# Patient Record
Sex: FEMALE | Race: BLACK OR AFRICAN AMERICAN | NOT HISPANIC OR LATINO
[De-identification: names, ages, dates, MRNs, and addresses within clinical notes are randomized per-mention and may not be internally consistent; named-entity substitution may affect disease eponyms.]

---

## 2022-09-22 ENCOUNTER — APPOINTMENT (OUTPATIENT)
Dept: ORTHOPEDIC SURGERY | Facility: CLINIC | Age: 82
End: 2022-09-22
Payer: MEDICARE

## 2022-09-22 VITALS — BODY MASS INDEX: 20.16 KG/M2 | WEIGHT: 100 LBS | HEIGHT: 59 IN

## 2022-09-22 DIAGNOSIS — M65.322 TRIGGER FINGER, LEFT INDEX FINGER: ICD-10-CM

## 2022-09-22 PROCEDURE — 73140 X-RAY EXAM OF FINGER(S): CPT | Mod: LT,F1

## 2022-09-22 PROCEDURE — 99214 OFFICE O/P EST MOD 30 MIN: CPT | Mod: 25

## 2022-09-22 PROCEDURE — 99213 OFFICE O/P EST LOW 20 MIN: CPT | Mod: 25

## 2022-09-22 PROCEDURE — 73130 X-RAY EXAM OF HAND: CPT | Mod: LT

## 2022-09-22 PROCEDURE — 20550 NJX 1 TENDON SHEATH/LIGAMENT: CPT

## 2022-09-22 NOTE — HISTORY OF PRESENT ILLNESS
[de-identified] : 9/22/22: Pt reports locking in her left index finger.  The pain is worse in the morning.

## 2022-09-22 NOTE — IMAGING
[de-identified] : left index finger TTP a1 pulley\par +triggering\par otherwise farom\par NVID\par  [Left] : left hand [There are no fractures, subluxations or dislocations. No significant abnormalities are seen] : There are no fractures, subluxations or dislocations. No significant abnormalities are seen

## 2022-10-27 ENCOUNTER — EMERGENCY (EMERGENCY)
Facility: HOSPITAL | Age: 82
LOS: 0 days | Discharge: HOME | End: 2022-10-27
Attending: STUDENT IN AN ORGANIZED HEALTH CARE EDUCATION/TRAINING PROGRAM

## 2022-10-27 VITALS
TEMPERATURE: 99 F | SYSTOLIC BLOOD PRESSURE: 142 MMHG | OXYGEN SATURATION: 93 % | HEIGHT: 59 IN | DIASTOLIC BLOOD PRESSURE: 68 MMHG | RESPIRATION RATE: 20 BRPM | HEART RATE: 118 BPM | WEIGHT: 100.09 LBS

## 2022-10-27 VITALS — SYSTOLIC BLOOD PRESSURE: 131 MMHG | HEART RATE: 96 BPM | DIASTOLIC BLOOD PRESSURE: 65 MMHG

## 2022-10-27 DIAGNOSIS — Z20.822 CONTACT WITH AND (SUSPECTED) EXPOSURE TO COVID-19: ICD-10-CM

## 2022-10-27 DIAGNOSIS — J45.909 UNSPECIFIED ASTHMA, UNCOMPLICATED: ICD-10-CM

## 2022-10-27 DIAGNOSIS — R00.0 TACHYCARDIA, UNSPECIFIED: ICD-10-CM

## 2022-10-27 DIAGNOSIS — R00.2 PALPITATIONS: ICD-10-CM

## 2022-10-27 DIAGNOSIS — M81.0 AGE-RELATED OSTEOPOROSIS WITHOUT CURRENT PATHOLOGICAL FRACTURE: ICD-10-CM

## 2022-10-27 DIAGNOSIS — I10 ESSENTIAL (PRIMARY) HYPERTENSION: ICD-10-CM

## 2022-10-27 LAB
ALBUMIN SERPL ELPH-MCNC: 3.7 G/DL — SIGNIFICANT CHANGE UP (ref 3.3–5)
ALP SERPL-CCNC: 101 U/L — SIGNIFICANT CHANGE UP (ref 40–120)
ALT FLD-CCNC: 25 U/L — SIGNIFICANT CHANGE UP (ref 12–78)
ANION GAP SERPL CALC-SCNC: 6 MMOL/L — SIGNIFICANT CHANGE UP (ref 5–17)
APPEARANCE UR: CLEAR — SIGNIFICANT CHANGE UP
AST SERPL-CCNC: 28 U/L — SIGNIFICANT CHANGE UP (ref 15–37)
BASOPHILS # BLD AUTO: 0.05 K/UL — SIGNIFICANT CHANGE UP (ref 0–0.2)
BASOPHILS NFR BLD AUTO: 0.8 % — SIGNIFICANT CHANGE UP (ref 0–2)
BILIRUB SERPL-MCNC: 0.2 MG/DL — SIGNIFICANT CHANGE UP (ref 0.2–1.2)
BILIRUB UR-MCNC: NEGATIVE — SIGNIFICANT CHANGE UP
BUN SERPL-MCNC: 14 MG/DL — SIGNIFICANT CHANGE UP (ref 7–23)
CALCIUM SERPL-MCNC: 9.8 MG/DL — SIGNIFICANT CHANGE UP (ref 8.5–10.1)
CHLORIDE SERPL-SCNC: 108 MMOL/L — SIGNIFICANT CHANGE UP (ref 96–108)
CO2 SERPL-SCNC: 28 MMOL/L — SIGNIFICANT CHANGE UP (ref 22–31)
COLOR SPEC: YELLOW — SIGNIFICANT CHANGE UP
CREAT SERPL-MCNC: 0.43 MG/DL — LOW (ref 0.5–1.3)
D DIMER BLD IA.RAPID-MCNC: 319 NG/ML DDU — HIGH
DIFF PNL FLD: ABNORMAL
EGFR: 98 ML/MIN/1.73M2 — SIGNIFICANT CHANGE UP
EOSINOPHIL # BLD AUTO: 0.16 K/UL — SIGNIFICANT CHANGE UP (ref 0–0.5)
EOSINOPHIL NFR BLD AUTO: 2.5 % — SIGNIFICANT CHANGE UP (ref 0–6)
FLUAV AG NPH QL: SIGNIFICANT CHANGE UP
FLUBV AG NPH QL: SIGNIFICANT CHANGE UP
GLUCOSE SERPL-MCNC: 98 MG/DL — SIGNIFICANT CHANGE UP (ref 70–99)
GLUCOSE UR QL: NEGATIVE MG/DL — SIGNIFICANT CHANGE UP
HCT VFR BLD CALC: 42 % — SIGNIFICANT CHANGE UP (ref 34.5–45)
HGB BLD-MCNC: 12.6 G/DL — SIGNIFICANT CHANGE UP (ref 11.5–15.5)
IMM GRANULOCYTES NFR BLD AUTO: 0.2 % — SIGNIFICANT CHANGE UP (ref 0–0.9)
KETONES UR-MCNC: ABNORMAL
LEUKOCYTE ESTERASE UR-ACNC: ABNORMAL
LYMPHOCYTES # BLD AUTO: 2.29 K/UL — SIGNIFICANT CHANGE UP (ref 1–3.3)
LYMPHOCYTES # BLD AUTO: 35.1 % — SIGNIFICANT CHANGE UP (ref 13–44)
MAGNESIUM SERPL-MCNC: 1.8 MG/DL — SIGNIFICANT CHANGE UP (ref 1.6–2.6)
MCHC RBC-ENTMCNC: 24 PG — LOW (ref 27–34)
MCHC RBC-ENTMCNC: 30 G/DL — LOW (ref 32–36)
MCV RBC AUTO: 79.8 FL — LOW (ref 80–100)
MONOCYTES # BLD AUTO: 0.57 K/UL — SIGNIFICANT CHANGE UP (ref 0–0.9)
MONOCYTES NFR BLD AUTO: 8.7 % — SIGNIFICANT CHANGE UP (ref 2–14)
NEUTROPHILS # BLD AUTO: 3.44 K/UL — SIGNIFICANT CHANGE UP (ref 1.8–7.4)
NEUTROPHILS NFR BLD AUTO: 52.7 % — SIGNIFICANT CHANGE UP (ref 43–77)
NITRITE UR-MCNC: NEGATIVE — SIGNIFICANT CHANGE UP
NRBC # BLD: 0 /100 WBCS — SIGNIFICANT CHANGE UP (ref 0–0)
PH UR: 6 — SIGNIFICANT CHANGE UP (ref 5–8)
PLATELET # BLD AUTO: 331 K/UL — SIGNIFICANT CHANGE UP (ref 150–400)
POTASSIUM SERPL-MCNC: 4 MMOL/L — SIGNIFICANT CHANGE UP (ref 3.5–5.3)
POTASSIUM SERPL-SCNC: 4 MMOL/L — SIGNIFICANT CHANGE UP (ref 3.5–5.3)
PROT SERPL-MCNC: 8.1 GM/DL — SIGNIFICANT CHANGE UP (ref 6–8.3)
PROT UR-MCNC: 15 MG/DL
RBC # BLD: 5.26 M/UL — HIGH (ref 3.8–5.2)
RBC # FLD: 15.3 % — HIGH (ref 10.3–14.5)
RBC CASTS # UR COMP ASSIST: SIGNIFICANT CHANGE UP /HPF (ref 0–4)
SARS-COV-2 RNA SPEC QL NAA+PROBE: SIGNIFICANT CHANGE UP
SODIUM SERPL-SCNC: 142 MMOL/L — SIGNIFICANT CHANGE UP (ref 135–145)
SP GR SPEC: 1.02 — SIGNIFICANT CHANGE UP (ref 1.01–1.02)
TSH SERPL-MCNC: 0.82 UU/ML — SIGNIFICANT CHANGE UP (ref 0.36–3.74)
UROBILINOGEN FLD QL: NEGATIVE MG/DL — SIGNIFICANT CHANGE UP
WBC # BLD: 6.52 K/UL — SIGNIFICANT CHANGE UP (ref 3.8–10.5)
WBC # FLD AUTO: 6.52 K/UL — SIGNIFICANT CHANGE UP (ref 3.8–10.5)
WBC UR QL: SIGNIFICANT CHANGE UP

## 2022-10-27 PROCEDURE — 99285 EMERGENCY DEPT VISIT HI MDM: CPT

## 2022-10-27 PROCEDURE — 71045 X-RAY EXAM CHEST 1 VIEW: CPT | Mod: 26

## 2022-10-27 PROCEDURE — 93010 ELECTROCARDIOGRAM REPORT: CPT

## 2022-10-27 RX ORDER — SODIUM CHLORIDE 9 MG/ML
1000 INJECTION, SOLUTION INTRAVENOUS ONCE
Refills: 0 | Status: COMPLETED | OUTPATIENT
Start: 2022-10-27 | End: 2022-10-27

## 2022-10-27 RX ADMIN — SODIUM CHLORIDE 1000 MILLILITER(S): 9 INJECTION, SOLUTION INTRAVENOUS at 12:58

## 2022-10-27 NOTE — ED ADULT NURSE NOTE - OBJECTIVE STATEMENT
Patient reports waking up with her heart racing this morning, she went to her daughter and she felt her heart beating really fast. Patient is alert and oriented x3. Reports history of Asthma, hypertension and high cholesterol. Patient  /73, O2 94% room air.

## 2022-10-27 NOTE — ED PROVIDER NOTE - PATIENT PORTAL LINK FT
You can access the FollowMyHealth Patient Portal offered by [facility] by registering at the following website: [email or web address]. By joining [facility]’s FollowMyHealth portal, you will also be able to view your health information using other applications (apps) compatible with our system.

## 2022-10-27 NOTE — ED ADULT NURSE NOTE - NS ED NURSE DC INFO COMPLEXITY
D/C instructions not given/Straightforward: Basic instructions, no meds, no home treatment/Verbalized Understanding

## 2022-10-27 NOTE — ED PROVIDER NOTE - PROGRESS NOTE DETAILS
Oliver: Patient reassessed and feeling well.  HR improved.  Low risk by wells and age adjusted dimer negative.  Reviewed results and plan for dc.  Will give cards follow up and dc.

## 2022-10-27 NOTE — ED PROVIDER NOTE - OBJECTIVE STATEMENT
81yo female with pmh asthma, htn, presenting with palpitations.  Started earlier this morning while making breakfast.  Has had similar before with evaluation with cardiology several months ago told everything was ok.  Has not had this again until today.  Felt like heart was racing, improved now.  No cp, sob, cough, fever, loc, lightheadedness.

## 2022-10-27 NOTE — ED PROVIDER NOTE - NSFOLLOWUPINSTRUCTIONS_ED_ALL_ED_FT
Rest, drink plenty of fluids  Advance activity as tolerated  Continue all previously prescribed medications as directed  Follow up with your PMD - bring copies of your results  Return to the ER for chest pain, difficulty breathing, if you pass out, or other new or concerning symptoms

## 2022-10-27 NOTE — ED PROVIDER NOTE - CARE PROVIDERS DIRECT ADDRESSES
[email or web address]

## 2022-10-27 NOTE — ED PROVIDER NOTE - CLINICAL SUMMARY MEDICAL DECISION MAKING FREE TEXT BOX
Presenting with palpitations.  Sinus tach on ecg, upon eval nsr.  Well appearing, no loc/ syncope/ cp/ sob/ fever.  Plan for labs, tele, xr, urine, reassess.

## 2022-10-27 NOTE — ED PROVIDER NOTE - PHYSICAL EXAMINATION
General appearance: Nontoxic appearing, conversant, afebrile    Eyes: anicteric sclerae, APPLE, EOMI   HENT: Atraumatic; oropharynx clear, MMM and no ulcerations, no pharyngeal erythema or exudate   Neck: Trachea midline; Full range of motion, supple   Pulm: CTA bl, normal respiratory effort and no intercostal retractions, normal work of breathing   CV: RRR, No murmurs, rubs, or gallops   Abdomen: Soft, non-tender, non-distended; no guarding or rebound   Extremities: No peripheral edema, no gross deformities, FROM x4   Skin: Dry, normal temperature, turgor and texture; no rash   Psych: Appropriate affect, cooperative

## 2022-10-27 NOTE — ED PROVIDER NOTE - CARE PROVIDER_API CALL
Ron Jang)  Cardiology; Interventional Cardiology  [address] [place] [id number]  Phone: [phone]  Fax: [phone]  Follow Up Time:     Enrike Adamson)  Cardiology; Cardiovascular Disease; Nuclear Cardiology  [address] [place] [address], [place] [address]  Phone: [phone]  Fax: [phone]  Follow Up Time:

## 2022-10-27 NOTE — ED PROVIDER NOTE - NSICDXPASTSURGICALHX_GEN_ALL_CORE_FT
PAST SURGICAL HISTORY:   delivery delivered x1    S/P hernia surgery left inguinal hernia repair    S/P hip hemiarthroplasty right hip surgery

## 2022-10-29 LAB
CULTURE RESULTS: SIGNIFICANT CHANGE UP
SPECIMEN SOURCE: SIGNIFICANT CHANGE UP

## 2022-10-30 ENCOUNTER — NON-APPOINTMENT (OUTPATIENT)
Age: 82
End: 2022-10-30

## 2022-10-31 ENCOUNTER — APPOINTMENT (OUTPATIENT)
Dept: CARDIOLOGY | Facility: CLINIC | Age: 82
End: 2022-10-31

## 2022-10-31 ENCOUNTER — NON-APPOINTMENT (OUTPATIENT)
Age: 82
End: 2022-10-31

## 2022-10-31 VITALS
DIASTOLIC BLOOD PRESSURE: 84 MMHG | OXYGEN SATURATION: 91 % | SYSTOLIC BLOOD PRESSURE: 150 MMHG | BODY MASS INDEX: 19.59 KG/M2 | HEART RATE: 105 BPM | TEMPERATURE: 98.2 F | WEIGHT: 97 LBS

## 2022-10-31 VITALS — DIASTOLIC BLOOD PRESSURE: 70 MMHG | SYSTOLIC BLOOD PRESSURE: 130 MMHG

## 2022-10-31 VITALS — OXYGEN SATURATION: 95 %

## 2022-10-31 DIAGNOSIS — R06.89 DYSPNEA, UNSPECIFIED: ICD-10-CM

## 2022-10-31 DIAGNOSIS — R00.0 TACHYCARDIA, UNSPECIFIED: ICD-10-CM

## 2022-10-31 DIAGNOSIS — E78.49 OTHER HYPERLIPIDEMIA: ICD-10-CM

## 2022-10-31 DIAGNOSIS — R06.00 DYSPNEA, UNSPECIFIED: ICD-10-CM

## 2022-10-31 PROCEDURE — 99204 OFFICE O/P NEW MOD 45 MIN: CPT | Mod: 25

## 2022-10-31 PROCEDURE — 93000 ELECTROCARDIOGRAM COMPLETE: CPT

## 2022-10-31 RX ORDER — MONTELUKAST 10 MG/1
10 TABLET, FILM COATED ORAL
Refills: 0 | Status: ACTIVE | COMMUNITY

## 2022-10-31 RX ORDER — ALBUTEROL SULFATE 90 UG/1
108 (90 BASE) AEROSOL, METERED RESPIRATORY (INHALATION)
Refills: 0 | Status: ACTIVE | COMMUNITY

## 2022-10-31 RX ORDER — FELODIPINE 10 MG/1
10 TABLET, EXTENDED RELEASE ORAL
Refills: 0 | Status: ACTIVE | COMMUNITY

## 2022-10-31 RX ORDER — CLONIDINE HYDROCHLORIDE 0.1 MG/1
0.1 TABLET ORAL
Refills: 0 | Status: ACTIVE | COMMUNITY

## 2022-10-31 RX ORDER — ROSUVASTATIN CALCIUM 10 MG/1
10 TABLET, FILM COATED ORAL
Refills: 0 | Status: ACTIVE | COMMUNITY

## 2022-10-31 RX ORDER — FLUTICASONE PROPIONATE AND SALMETEROL XINAFOATE 230; 21 UG/1; UG/1
AEROSOL, METERED RESPIRATORY (INHALATION)
Refills: 0 | Status: ACTIVE | COMMUNITY

## 2022-10-31 NOTE — DISCUSSION/SUMMARY
[FreeTextEntry1] : Pt with sinus tach, tachypnea, O2 readings 91-95%\par She denies overt dyspnea but is breathing fast on exam\par Lung exam grossly normal\par She had a mildly elevated D-Dimer on a recent ER visit\par \par Her symptoms might all be secondary to asthma, but given the above, would want to rule out PE\par Will send for CTA to rule out PE\par Check echo to ensure normal LV size and function\par \par BP well controlled, cont meds\par Can consider event monitoring\par Pulmonary eval

## 2022-10-31 NOTE — HISTORY OF PRESENT ILLNESS
[FreeTextEntry1] : 82F with HTN, HLD who presents for eval of sinus tachycardia, racing heart\par \par Pt went to [facility] ER last week due to heart racing\par Noted on ECG with sinus tach to 120\par Slightly elevated D-Dimer, otherwise labs unremarkable\par HR improved, pt sent home\par Continues to feel sensation of heart racing\par Had a similar episode last year, was thought secondary to theophylline use which was d/c'ed\par She takes albuterol prn for asthma\par Denies CP, dyspnea, lightheadedness\par \par Never smoker. Accompanied by 2 daughters\par \par ECG: Sinus tach to 105 with nonspecific T wave changes\par \par Crestor 10mg\par \par Felodipine 10mg\par Clonidine 0.1mg TID\par \par

## 2022-10-31 NOTE — PHYSICAL EXAM
[Well Developed] : well developed [Well Nourished] : well nourished [No Acute Distress] : no acute distress [Normal Conjunctiva] : normal conjunctiva [Normal Venous Pressure] : normal venous pressure [No Carotid Bruit] : no carotid bruit [Normal S1, S2] : normal S1, S2 [No Murmur] : no murmur [No Rub] : no rub [No Gallop] : no gallop [Clear Lung Fields] : clear lung fields [Good Air Entry] : good air entry [Soft] : abdomen soft [Non Tender] : non-tender [No Masses/organomegaly] : no masses/organomegaly [Normal Bowel Sounds] : normal bowel sounds [Normal Gait] : normal gait [No Edema] : no edema [No Cyanosis] : no cyanosis [No Clubbing] : no clubbing [No Varicosities] : no varicosities [No Rash] : no rash [No Skin Lesions] : no skin lesions [Moves all extremities] : moves all extremities [No Focal Deficits] : no focal deficits [Normal Speech] : normal speech [Alert and Oriented] : alert and oriented [Normal memory] : normal memory [de-identified] : tachypneic

## 2022-11-04 ENCOUNTER — APPOINTMENT (OUTPATIENT)
Dept: INTERNAL MEDICINE | Facility: CLINIC | Age: 82
End: 2022-11-04

## 2022-11-04 PROCEDURE — 93306 TTE W/DOPPLER COMPLETE: CPT

## 2023-06-04 ENCOUNTER — NON-APPOINTMENT (OUTPATIENT)
Age: 83
End: 2023-06-04

## 2023-06-05 ENCOUNTER — EMERGENCY (EMERGENCY)
Facility: HOSPITAL | Age: 83
LOS: 0 days | Discharge: HOME | End: 2023-06-05
Attending: EMERGENCY MEDICINE
Payer: MEDICARE

## 2023-06-05 VITALS
DIASTOLIC BLOOD PRESSURE: 72 MMHG | OXYGEN SATURATION: 91 % | SYSTOLIC BLOOD PRESSURE: 126 MMHG | RESPIRATION RATE: 19 BRPM | TEMPERATURE: 99 F | WEIGHT: 98.99 LBS | HEART RATE: 104 BPM | HEIGHT: 59 IN

## 2023-06-05 VITALS
DIASTOLIC BLOOD PRESSURE: 78 MMHG | SYSTOLIC BLOOD PRESSURE: 145 MMHG | TEMPERATURE: 98 F | RESPIRATION RATE: 18 BRPM | HEART RATE: 98 BPM | OXYGEN SATURATION: 96 %
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
## 2023-06-05 DIAGNOSIS — Z04.3 ENCOUNTER FOR EXAMINATION AND OBSERVATION FOLLOWING OTHER ACCIDENT: ICD-10-CM
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
## 2023-06-05 DIAGNOSIS — J45.909 UNSPECIFIED ASTHMA, UNCOMPLICATED: ICD-10-CM

## 2023-06-05 DIAGNOSIS — M48.32 TRAUMATIC SPONDYLOPATHY, CERVICAL REGION: ICD-10-CM

## 2023-06-05 DIAGNOSIS — S01.21XA LACERATION WITHOUT FOREIGN BODY OF NOSE, INITIAL ENCOUNTER: ICD-10-CM

## 2023-06-05 DIAGNOSIS — E78.00 PURE HYPERCHOLESTEROLEMIA, UNSPECIFIED: ICD-10-CM

## 2023-06-05 DIAGNOSIS — I10 ESSENTIAL (PRIMARY) HYPERTENSION: ICD-10-CM

## 2023-06-05 DIAGNOSIS — M81.0 AGE-RELATED OSTEOPOROSIS WITHOUT CURRENT PATHOLOGICAL FRACTURE: ICD-10-CM

## 2023-06-05 PROCEDURE — 70450 CT HEAD/BRAIN W/O DYE: CPT | Mod: 26,MA

## 2023-06-05 PROCEDURE — 99284 EMERGENCY DEPT VISIT MOD MDM: CPT

## 2023-06-05 PROCEDURE — 70486 CT MAXILLOFACIAL W/O DYE: CPT | Mod: 26,MA

## 2023-06-05 PROCEDURE — 72125 CT NECK SPINE W/O DYE: CPT | Mod: 26,MA

## 2023-06-05 RX ORDER — BACITRACIN ZINC 500 UNIT/G
1 OINTMENT IN PACKET (EA) TOPICAL ONCE
Refills: 0 | Status: COMPLETED | OUTPATIENT
Start: 2023-06-05 | End: 2023-06-05

## 2023-06-05 RX ADMIN — Medication 1 APPLICATION(S): at 14:15

## 2023-06-05 NOTE — ED ADULT NURSE NOTE - NSFALLRISKINTERV_ED_ALL_ED

## 2023-06-05 NOTE — ED ADULT TRIAGE NOTE - CHIEF COMPLAINT QUOTE
patient c/o of nose pain , patient  had a fall today face down no LOC , denied dizziness denied blurred vision , denied any weakness , moving all extremities no facial droop clear speech at this time patient on Aspirin, denied difficulty breathing

## 2023-06-05 NOTE — ED PROVIDER NOTE - OBJECTIVE STATEMENT
This patient is an 82 year old woman sent from urgent care for CT scan after a fall.  Patient fell from standing hitting her face.  No LOC.  Patient is not on anticoagulation medication.  No chest pain, vomiting, dizziness or headache.  She reports pain at the wound site.

## 2023-06-05 NOTE — ED PROVIDER NOTE - PATIENT PORTAL LINK FT
You can access the FollowMyHealth Patient Portal offered by [facility] by registering at the following website: [email or web address]. By joining [employer]’s FollowMyHealth portal, you will also be able to view your health information using other applications (apps) compatible with our system.

## 2023-06-05 NOTE — ED PROVIDER NOTE - CLINICAL SUMMARY MEDICAL DECISION MAKING FREE TEXT BOX
Nose pain s/p fall low suspicion for fracture r/o ICH.  Will get CT.  Patient was offered pain medication but declined. Nose pain s/p fall low suspicion for fracture r/o ICH.  Will get CT.  Patient was offered pain medication but declined.    CT negative.  Wound care instructions given and patient discharged

## 2023-06-05 NOTE — ED ADULT NURSE NOTE - OBJECTIVE STATEMENT
Pt is A&OX4, ambulatory with cane, accompanied by two daughters. S/p fall today, fell on nose. Complaining of bleeding at the time of fall, no bleeding now. Abrasion noted to nose. Denies LOC, blurry vision, weakness. Denies pain at the time. Pt taking asprin 81mg. pmh of htn, asthma, high cholesterol

## 2023-06-05 NOTE — ED PROVIDER NOTE - NSFOLLOWUPINSTRUCTIONS_ED_ALL_ED_FT
1) Take tylenol for pain  2) Keep wound clean.  You can wash with soap and water, dry then apply bacitracin or vaseline/petroleum jelly  3) Follow up with your primary care doctor  4) Return to the ER for worsening or concerning symptoms
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
## 2023-07-31 ENCOUNTER — APPOINTMENT (OUTPATIENT)
Dept: CARDIOLOGY | Facility: CLINIC | Age: 83
End: 2023-07-31
Payer: MEDICARE

## 2023-07-31 DIAGNOSIS — I35.0 NONRHEUMATIC AORTIC (VALVE) STENOSIS: ICD-10-CM

## 2023-07-31 DIAGNOSIS — I51.9 HEART DISEASE, UNSPECIFIED: ICD-10-CM

## 2023-07-31 DIAGNOSIS — I10 ESSENTIAL (PRIMARY) HYPERTENSION: ICD-10-CM

## 2023-07-31 PROBLEM — E78.00 PURE HYPERCHOLESTEROLEMIA, UNSPECIFIED: Chronic | Status: ACTIVE | Noted: 2023-06-05

## 2023-07-31 PROCEDURE — 93000 ELECTROCARDIOGRAM COMPLETE: CPT

## 2023-07-31 PROCEDURE — 99213 OFFICE O/P EST LOW 20 MIN: CPT | Mod: 25

## 2023-08-02 VITALS
WEIGHT: 95 LBS | DIASTOLIC BLOOD PRESSURE: 64 MMHG | SYSTOLIC BLOOD PRESSURE: 122 MMHG | BODY MASS INDEX: 19.15 KG/M2 | OXYGEN SATURATION: 95 % | HEART RATE: 92 BPM | HEIGHT: 59 IN

## 2023-08-02 PROBLEM — I10 BENIGN ESSENTIAL HYPERTENSION: Status: ACTIVE | Noted: 2022-10-31

## 2023-08-02 PROBLEM — I35.0 MILD AORTIC STENOSIS: Status: ACTIVE | Noted: 2023-08-02

## 2023-08-02 PROBLEM — I51.9 MILD DIASTOLIC DYSFUNCTION: Status: ACTIVE | Noted: 2023-08-02

## 2023-08-02 NOTE — HISTORY OF PRESENT ILLNESS
[FreeTextEntry1] : 83F with HTN, HLD who presents for eval of sinus tachycardia, here for follow up   Pt feeling great overall No further episodes of racing heart No CP, dyspnea, palpitations  Last visit, sent for CTA which was negative for PE  HISTORY:  Pt went to [facility] ER last week due to heart racing Noted on ECG with sinus tach to 120 Slightly elevated D-Dimer, otherwise labs unremarkable HR improved, pt sent home Had a similar episode last year, was thought secondary to theophylline use which was d/c'ed She takes albuterol prn for asthma  Never smoker. Accompanied by daughter  ECG: Sinus 92, nonspecific ST-T wave changes TTE: 65-70%, mild DD, mild AS   Crestor 10mg  Felodipine 10mg Clonidine 0.1mg TID

## 2023-08-02 NOTE — PHYSICAL EXAM
[Well Developed] : well developed [Well Nourished] : well nourished [No Acute Distress] : no acute distress [Normal Conjunctiva] : normal conjunctiva [Normal Venous Pressure] : normal venous pressure [No Carotid Bruit] : no carotid bruit [Normal S1, S2] : normal S1, S2 [No Rub] : no rub [No Gallop] : no gallop [Clear Lung Fields] : clear lung fields [Good Air Entry] : good air entry [Soft] : abdomen soft [Non Tender] : non-tender [No Masses/organomegaly] : no masses/organomegaly [Normal Bowel Sounds] : normal bowel sounds [Normal Gait] : normal gait [No Cyanosis] : no cyanosis [No Clubbing] : no clubbing [No Varicosities] : no varicosities [No Rash] : no rash [No Skin Lesions] : no skin lesions [Moves all extremities] : moves all extremities [No Focal Deficits] : no focal deficits [Normal Speech] : normal speech [Alert and Oriented] : alert and oriented [Normal memory] : normal memory [Murmur] : murmur [Edema ___] : edema [unfilled] [de-identified] : 2/6 murmur on exam [de-identified] : tachypneic

## 2024-01-12 ENCOUNTER — APPOINTMENT (OUTPATIENT)
Dept: CARDIOLOGY | Facility: CLINIC | Age: 84
End: 2024-01-12

## 2024-07-19 ENCOUNTER — TRANSCRIPTION ENCOUNTER (OUTPATIENT)
Age: 84
End: 2024-07-19

## 2024-07-23 ENCOUNTER — TRANSCRIPTION ENCOUNTER (OUTPATIENT)
Age: 84
End: 2024-07-23

## 2024-10-25 PROBLEM — I10 ESSENTIAL (PRIMARY) HYPERTENSION: Chronic | Status: ACTIVE | Noted: 2024-07-19

## 2024-10-25 PROBLEM — J45.909 UNSPECIFIED ASTHMA, UNCOMPLICATED: Chronic | Status: ACTIVE | Noted: 2024-07-19

## 2024-10-25 PROBLEM — E78.5 HYPERLIPIDEMIA, UNSPECIFIED: Chronic | Status: ACTIVE | Noted: 2024-07-19

## 2024-10-28 ENCOUNTER — APPOINTMENT (OUTPATIENT)
Dept: ORTHOPEDIC SURGERY | Facility: CLINIC | Age: 84
End: 2024-10-28

## 2024-10-28 VITALS — WEIGHT: 96 LBS | HEIGHT: 59 IN | BODY MASS INDEX: 19.35 KG/M2

## 2024-10-28 DIAGNOSIS — E78.00 PURE HYPERCHOLESTEROLEMIA, UNSPECIFIED: ICD-10-CM

## 2024-10-28 DIAGNOSIS — M25.561 PAIN IN RIGHT KNEE: ICD-10-CM

## 2024-10-28 DIAGNOSIS — J45.909 UNSPECIFIED ASTHMA, UNCOMPLICATED: ICD-10-CM

## 2024-10-28 DIAGNOSIS — I10 ESSENTIAL (PRIMARY) HYPERTENSION: ICD-10-CM

## 2024-10-28 DIAGNOSIS — Z78.9 OTHER SPECIFIED HEALTH STATUS: ICD-10-CM

## 2024-10-28 PROCEDURE — 73564 X-RAY EXAM KNEE 4 OR MORE: CPT | Mod: RT

## 2024-10-28 PROCEDURE — 20611 DRAIN/INJ JOINT/BURSA W/US: CPT | Mod: RT

## 2024-10-28 PROCEDURE — J3490M: CUSTOM

## 2024-10-28 RX ORDER — CLONIDINE HYDROCHLORIDE 0.1 MG/1
0.1 TABLET ORAL
Refills: 0 | Status: ACTIVE | COMMUNITY

## 2024-10-28 RX ORDER — ALBUTEROL SULFATE 2.5 MG/.5ML
SOLUTION RESPIRATORY (INHALATION)
Refills: 0 | Status: ACTIVE | COMMUNITY

## 2024-11-07 ENCOUNTER — APPOINTMENT (OUTPATIENT)
Dept: ORTHOPEDIC SURGERY | Facility: CLINIC | Age: 84
End: 2024-11-07

## 2024-11-07 VITALS — BODY MASS INDEX: 19.35 KG/M2 | WEIGHT: 96 LBS | HEIGHT: 59 IN

## 2024-11-07 PROBLEM — M17.11 PRIMARY OSTEOARTHRITIS OF RIGHT KNEE: Status: ACTIVE | Noted: 2024-10-28

## 2024-11-07 PROCEDURE — 20611 DRAIN/INJ JOINT/BURSA W/US: CPT | Mod: RT

## 2024-11-14 ENCOUNTER — APPOINTMENT (OUTPATIENT)
Dept: ORTHOPEDIC SURGERY | Facility: CLINIC | Age: 84
End: 2024-11-14
Payer: MEDICARE

## 2024-11-14 VITALS — HEIGHT: 59 IN | BODY MASS INDEX: 19.35 KG/M2 | WEIGHT: 96 LBS

## 2024-11-14 PROCEDURE — 20611 DRAIN/INJ JOINT/BURSA W/US: CPT | Mod: RT

## 2024-11-15 ENCOUNTER — APPOINTMENT (OUTPATIENT)
Dept: ORTHOPEDIC SURGERY | Facility: CLINIC | Age: 84
End: 2024-11-15

## 2024-11-22 ENCOUNTER — APPOINTMENT (OUTPATIENT)
Dept: ORTHOPEDIC SURGERY | Facility: CLINIC | Age: 84
End: 2024-11-22

## 2024-11-22 VITALS — HEIGHT: 59 IN

## 2024-11-22 DIAGNOSIS — M17.11 UNILATERAL PRIMARY OSTEOARTHRITIS, RIGHT KNEE: ICD-10-CM

## 2024-11-22 PROCEDURE — 20611 DRAIN/INJ JOINT/BURSA W/US: CPT | Mod: RT

## 2024-12-30 ENCOUNTER — INPATIENT (INPATIENT)
Facility: HOSPITAL | Age: 84
LOS: 10 days | Discharge: HOME | End: 2025-01-10
Attending: INTERNAL MEDICINE | Admitting: INTERNAL MEDICINE
Payer: MEDICARE

## 2024-12-30 VITALS
TEMPERATURE: 98 F | RESPIRATION RATE: 25 BRPM | SYSTOLIC BLOOD PRESSURE: 118 MMHG | DIASTOLIC BLOOD PRESSURE: 58 MMHG | OXYGEN SATURATION: 88 % | HEIGHT: 57 IN | WEIGHT: 95.9 LBS | HEART RATE: 125 BPM

## 2024-12-30 DIAGNOSIS — I10 ESSENTIAL (PRIMARY) HYPERTENSION: ICD-10-CM

## 2024-12-30 DIAGNOSIS — E78.5 HYPERLIPIDEMIA, UNSPECIFIED: ICD-10-CM

## 2024-12-30 DIAGNOSIS — J96.01 ACUTE RESPIRATORY FAILURE WITH HYPOXIA: ICD-10-CM

## 2024-12-30 DIAGNOSIS — J45.901 UNSPECIFIED ASTHMA WITH (ACUTE) EXACERBATION: ICD-10-CM

## 2024-12-30 LAB
ALBUMIN SERPL ELPH-MCNC: 3.1 G/DL — LOW (ref 3.3–5)
ALP SERPL-CCNC: 123 U/L — HIGH (ref 40–120)
ALT FLD-CCNC: 18 U/L — SIGNIFICANT CHANGE UP (ref 12–78)
ANION GAP SERPL CALC-SCNC: 2 MMOL/L — LOW (ref 5–17)
APPEARANCE UR: CLEAR — SIGNIFICANT CHANGE UP
APTT BLD: 28.8 SEC — SIGNIFICANT CHANGE UP (ref 24.5–35.6)
AST SERPL-CCNC: 19 U/L — SIGNIFICANT CHANGE UP (ref 15–37)
BASOPHILS # BLD AUTO: 0.04 K/UL — SIGNIFICANT CHANGE UP (ref 0–0.2)
BASOPHILS NFR BLD AUTO: 0.4 % — SIGNIFICANT CHANGE UP (ref 0–2)
BILIRUB SERPL-MCNC: 0.2 MG/DL — SIGNIFICANT CHANGE UP (ref 0.2–1.2)
BILIRUB UR-MCNC: NEGATIVE — SIGNIFICANT CHANGE UP
BUN SERPL-MCNC: 14 MG/DL — SIGNIFICANT CHANGE UP (ref 7–23)
CALCIUM SERPL-MCNC: 9.7 MG/DL — SIGNIFICANT CHANGE UP (ref 8.5–10.1)
CHLORIDE SERPL-SCNC: 106 MMOL/L — SIGNIFICANT CHANGE UP (ref 96–108)
CO2 SERPL-SCNC: 31 MMOL/L — SIGNIFICANT CHANGE UP (ref 22–31)
COLOR SPEC: YELLOW — SIGNIFICANT CHANGE UP
CREAT SERPL-MCNC: 0.6 MG/DL — SIGNIFICANT CHANGE UP (ref 0.5–1.3)
DIFF PNL FLD: NEGATIVE — SIGNIFICANT CHANGE UP
EGFR: 90 ML/MIN/1.73M2 — SIGNIFICANT CHANGE UP
EOSINOPHIL # BLD AUTO: 0.26 K/UL — SIGNIFICANT CHANGE UP (ref 0–0.5)
EOSINOPHIL NFR BLD AUTO: 2.4 % — SIGNIFICANT CHANGE UP (ref 0–6)
FLUAV AG NPH QL: SIGNIFICANT CHANGE UP
FLUBV AG NPH QL: SIGNIFICANT CHANGE UP
GLUCOSE SERPL-MCNC: 103 MG/DL — HIGH (ref 70–99)
GLUCOSE UR QL: 250 MG/DL
HCT VFR BLD CALC: 42.7 % — SIGNIFICANT CHANGE UP (ref 34.5–45)
HGB BLD-MCNC: 12.8 G/DL — SIGNIFICANT CHANGE UP (ref 11.5–15.5)
IMM GRANULOCYTES NFR BLD AUTO: 0.2 % — SIGNIFICANT CHANGE UP (ref 0–0.9)
INR BLD: 1.15 RATIO — SIGNIFICANT CHANGE UP (ref 0.85–1.16)
KETONES UR-MCNC: 15 MG/DL
LACTATE SERPL-SCNC: 1.4 MMOL/L — SIGNIFICANT CHANGE UP (ref 0.7–2)
LEUKOCYTE ESTERASE UR-ACNC: NEGATIVE — SIGNIFICANT CHANGE UP
LYMPHOCYTES # BLD AUTO: 2.2 K/UL — SIGNIFICANT CHANGE UP (ref 1–3.3)
LYMPHOCYTES # BLD AUTO: 20.7 % — SIGNIFICANT CHANGE UP (ref 13–44)
MCHC RBC-ENTMCNC: 24.5 PG — LOW (ref 27–34)
MCHC RBC-ENTMCNC: 30 G/DL — LOW (ref 32–36)
MCV RBC AUTO: 81.6 FL — SIGNIFICANT CHANGE UP (ref 80–100)
MONOCYTES # BLD AUTO: 0.66 K/UL — SIGNIFICANT CHANGE UP (ref 0–0.9)
MONOCYTES NFR BLD AUTO: 6.2 % — SIGNIFICANT CHANGE UP (ref 2–14)
NEUTROPHILS # BLD AUTO: 7.44 K/UL — HIGH (ref 1.8–7.4)
NEUTROPHILS NFR BLD AUTO: 70.1 % — SIGNIFICANT CHANGE UP (ref 43–77)
NITRITE UR-MCNC: NEGATIVE — SIGNIFICANT CHANGE UP
NRBC # BLD: 0 /100 WBCS — SIGNIFICANT CHANGE UP (ref 0–0)
NT-PROBNP SERPL-SCNC: 68 PG/ML — SIGNIFICANT CHANGE UP (ref 0–450)
PH UR: 6 — SIGNIFICANT CHANGE UP (ref 5–8)
PLATELET # BLD AUTO: 326 K/UL — SIGNIFICANT CHANGE UP (ref 150–400)
POTASSIUM SERPL-MCNC: 3.6 MMOL/L — SIGNIFICANT CHANGE UP (ref 3.5–5.3)
POTASSIUM SERPL-SCNC: 3.6 MMOL/L — SIGNIFICANT CHANGE UP (ref 3.5–5.3)
PROT SERPL-MCNC: 8.3 GM/DL — SIGNIFICANT CHANGE UP (ref 6–8.3)
PROT UR-MCNC: SIGNIFICANT CHANGE UP MG/DL
PROTHROM AB SERPL-ACNC: 13 SEC — SIGNIFICANT CHANGE UP (ref 9.9–13.4)
RBC # BLD: 5.23 M/UL — HIGH (ref 3.8–5.2)
RBC # FLD: 16.2 % — HIGH (ref 10.3–14.5)
RSV RNA NPH QL NAA+NON-PROBE: SIGNIFICANT CHANGE UP
SARS-COV-2 RNA SPEC QL NAA+PROBE: SIGNIFICANT CHANGE UP
SODIUM SERPL-SCNC: 139 MMOL/L — SIGNIFICANT CHANGE UP (ref 135–145)
SP GR SPEC: 1.01 — SIGNIFICANT CHANGE UP (ref 1–1.03)
TROPONIN I, HIGH SENSITIVITY RESULT: 5.7 NG/L — SIGNIFICANT CHANGE UP
UROBILINOGEN FLD QL: 0.2 MG/DL — SIGNIFICANT CHANGE UP (ref 0.2–1)
WBC # BLD: 10.62 K/UL — HIGH (ref 3.8–10.5)
WBC # FLD AUTO: 10.62 K/UL — HIGH (ref 3.8–10.5)

## 2024-12-30 PROCEDURE — 99223 1ST HOSP IP/OBS HIGH 75: CPT

## 2024-12-30 PROCEDURE — 71045 X-RAY EXAM CHEST 1 VIEW: CPT | Mod: 26

## 2024-12-30 PROCEDURE — 99291 CRITICAL CARE FIRST HOUR: CPT

## 2024-12-30 RX ORDER — CEFTRIAXONE SODIUM 1 G/1
1000 INJECTION, POWDER, FOR SOLUTION INTRAMUSCULAR; INTRAVENOUS ONCE
Refills: 0 | Status: COMPLETED | OUTPATIENT
Start: 2024-12-30 | End: 2024-12-30

## 2024-12-30 RX ORDER — CLONIDINE HYDROCHLORIDE 0.2 MG/1
0.1 TABLET ORAL DAILY
Refills: 0 | Status: DISCONTINUED | OUTPATIENT
Start: 2024-12-30 | End: 2025-01-02

## 2024-12-30 RX ORDER — GINKGO BILOBA 40 MG
3 CAPSULE ORAL AT BEDTIME
Refills: 0 | Status: DISCONTINUED | OUTPATIENT
Start: 2024-12-30 | End: 2025-01-10

## 2024-12-30 RX ORDER — IPRATROPIUM BROMIDE AND ALBUTEROL SULFATE .5; 2.5 MG/3ML; MG/3ML
3 SOLUTION RESPIRATORY (INHALATION) EVERY 6 HOURS
Refills: 0 | Status: DISCONTINUED | OUTPATIENT
Start: 2024-12-30 | End: 2025-01-06

## 2024-12-30 RX ORDER — ENOXAPARIN SODIUM 60 MG/.6ML
30 INJECTION INTRAVENOUS; SUBCUTANEOUS EVERY 24 HOURS
Refills: 0 | Status: DISCONTINUED | OUTPATIENT
Start: 2024-12-30 | End: 2025-01-10

## 2024-12-30 RX ORDER — SODIUM CHLORIDE 9 MG/ML
1350 INJECTION, SOLUTION INTRAMUSCULAR; INTRAVENOUS; SUBCUTANEOUS ONCE
Refills: 0 | Status: COMPLETED | OUTPATIENT
Start: 2024-12-30 | End: 2024-12-30

## 2024-12-30 RX ORDER — ACETAMINOPHEN 80 MG/.8ML
650 SOLUTION/ DROPS ORAL EVERY 6 HOURS
Refills: 0 | Status: DISCONTINUED | OUTPATIENT
Start: 2024-12-30 | End: 2025-01-10

## 2024-12-30 RX ORDER — BENZONATATE 100 MG
100 CAPSULE ORAL THREE TIMES A DAY
Refills: 0 | Status: DISCONTINUED | OUTPATIENT
Start: 2024-12-30 | End: 2025-01-10

## 2024-12-30 RX ORDER — MONTELUKAST SODIUM 10 MG/1
10 TABLET, FILM COATED ORAL DAILY
Refills: 0 | Status: DISCONTINUED | OUTPATIENT
Start: 2024-12-30 | End: 2025-01-10

## 2024-12-30 RX ORDER — ROSUVASTATIN 40 MG/1
10 TABLET, FILM COATED ORAL AT BEDTIME
Refills: 0 | Status: DISCONTINUED | OUTPATIENT
Start: 2024-12-30 | End: 2025-01-10

## 2024-12-30 RX ORDER — SENNOSIDES 8.6 MG/1
2 TABLET, FILM COATED ORAL AT BEDTIME
Refills: 0 | Status: DISCONTINUED | OUTPATIENT
Start: 2024-12-30 | End: 2025-01-10

## 2024-12-30 RX ORDER — IPRATROPIUM BROMIDE AND ALBUTEROL SULFATE .5; 2.5 MG/3ML; MG/3ML
3 SOLUTION RESPIRATORY (INHALATION)
Refills: 0 | Status: DISCONTINUED | OUTPATIENT
Start: 2024-12-30 | End: 2024-12-31

## 2024-12-30 RX ORDER — ENOXAPARIN SODIUM 60 MG/.6ML
40 INJECTION INTRAVENOUS; SUBCUTANEOUS EVERY 24 HOURS
Refills: 0 | Status: DISCONTINUED | OUTPATIENT
Start: 2024-12-30 | End: 2024-12-30

## 2024-12-30 RX ORDER — AZITHROMYCIN MONOHYDRATE 200 MG/5ML
500 POWDER, FOR SUSPENSION ORAL ONCE
Refills: 0 | Status: COMPLETED | OUTPATIENT
Start: 2024-12-30 | End: 2024-12-30

## 2024-12-30 RX ORDER — METHYLPREDNISOLONE 4 MG/1
20 TABLET ORAL EVERY 8 HOURS
Refills: 0 | Status: DISCONTINUED | OUTPATIENT
Start: 2024-12-30 | End: 2024-12-31

## 2024-12-30 RX ORDER — MAGNESIUM SULFATE 500 MG/ML
2 INJECTION, SOLUTION INTRAMUSCULAR; INTRAVENOUS ONCE
Refills: 0 | Status: COMPLETED | OUTPATIENT
Start: 2024-12-30 | End: 2024-12-30

## 2024-12-30 RX ORDER — METHYLPREDNISOLONE 4 MG/1
125 TABLET ORAL ONCE
Refills: 0 | Status: COMPLETED | OUTPATIENT
Start: 2024-12-30 | End: 2024-12-30

## 2024-12-30 RX ADMIN — IPRATROPIUM BROMIDE AND ALBUTEROL SULFATE 3 MILLILITER(S): .5; 2.5 SOLUTION RESPIRATORY (INHALATION) at 12:01

## 2024-12-30 RX ADMIN — METHYLPREDNISOLONE 20 MILLIGRAM(S): 4 TABLET ORAL at 22:39

## 2024-12-30 RX ADMIN — ENOXAPARIN SODIUM 30 MILLIGRAM(S): 60 INJECTION INTRAVENOUS; SUBCUTANEOUS at 17:16

## 2024-12-30 RX ADMIN — SODIUM CHLORIDE 1350 MILLILITER(S): 9 INJECTION, SOLUTION INTRAMUSCULAR; INTRAVENOUS; SUBCUTANEOUS at 12:17

## 2024-12-30 RX ADMIN — AZITHROMYCIN MONOHYDRATE 255 MILLIGRAM(S): 200 POWDER, FOR SUSPENSION ORAL at 12:09

## 2024-12-30 RX ADMIN — IPRATROPIUM BROMIDE AND ALBUTEROL SULFATE 3 MILLILITER(S): .5; 2.5 SOLUTION RESPIRATORY (INHALATION) at 13:41

## 2024-12-30 RX ADMIN — SENNOSIDES 2 TABLET(S): 8.6 TABLET, FILM COATED ORAL at 22:36

## 2024-12-30 RX ADMIN — CEFTRIAXONE SODIUM 100 MILLIGRAM(S): 1 INJECTION, POWDER, FOR SOLUTION INTRAMUSCULAR; INTRAVENOUS at 12:00

## 2024-12-30 RX ADMIN — ROSUVASTATIN 10 MILLIGRAM(S): 40 TABLET, FILM COATED ORAL at 22:35

## 2024-12-30 RX ADMIN — MONTELUKAST SODIUM 10 MILLIGRAM(S): 10 TABLET, FILM COATED ORAL at 17:16

## 2024-12-30 RX ADMIN — CLONIDINE HYDROCHLORIDE 0.1 MILLIGRAM(S): 0.2 TABLET ORAL at 18:02

## 2024-12-30 RX ADMIN — IPRATROPIUM BROMIDE AND ALBUTEROL SULFATE 3 MILLILITER(S): .5; 2.5 SOLUTION RESPIRATORY (INHALATION) at 17:12

## 2024-12-30 RX ADMIN — IPRATROPIUM BROMIDE AND ALBUTEROL SULFATE 3 MILLILITER(S): .5; 2.5 SOLUTION RESPIRATORY (INHALATION) at 23:52

## 2024-12-30 RX ADMIN — Medication 10 MILLIGRAM(S): at 17:16

## 2024-12-30 RX ADMIN — MAGNESIUM SULFATE 150 GRAM(S): 500 INJECTION, SOLUTION INTRAMUSCULAR; INTRAVENOUS at 12:09

## 2024-12-30 RX ADMIN — METHYLPREDNISOLONE 125 MILLIGRAM(S): 4 TABLET ORAL at 12:01

## 2024-12-30 NOTE — H&P ADULT - PROBLEM SELECTOR PLAN 2
SOB, cough, wheezing  Received IV ceftriaxone, azithromycin, solumedrol 125mg, duoneb in ED  continue IV solumedrol 20mg q8hr  Duoneb q6hr  Continue montelukast   tessalon prn for cough   check RVP and serum procalcitonin

## 2024-12-30 NOTE — ED ADULT TRIAGE NOTE - CHIEF COMPLAINT QUOTE
pt c/o shortness of breath , congestion, wheezing and cough for week. sent by PMD for low oxygen saturation. history of htn, asthma. and high cholesterol. o2 at triage 88%

## 2024-12-30 NOTE — H&P ADULT - PROBLEM SELECTOR PLAN 1
hypoxic to 88% at RA, tachypneic, tachycardic. Improved with NC  CXR  ( I personally review) no focal consolidation to my eyes, pending official read  Received IV ceftriaxone, azithromycin, solumedrol 125mg, duoneb in ED  continue IV solumedrol 20mg q8hr  Duoneb q6hr  check RVP and serum procalcitonin  Closely monitor respiratory status and wean off NC as tolerate

## 2024-12-30 NOTE — ED ADULT NURSE NOTE - OBJECTIVE STATEMENT
Patient is 84 years old female, alert & oriented x 4 was sent here by her PMD for low O2 sat. Patient is complaining of cough x 5 days and got worse 2-3 days with SOB and wheezing. (+) Chest pain when coughing. Denies fever.  PMHX: Asthma, HTN, HLD.

## 2024-12-30 NOTE — PATIENT PROFILE ADULT - NSPROPOAURINARYCATHETER_GEN_A_NUR
1/5/18  Saw patient with Moshe Keller NP. She was accompanied by her daughter. She is here for pre-chemo visit for TC #1. She complains of recent cold and cough, treated by her PCP. She was advised to take Mucinex to help with lingering cough. Reviewed instructions for steroids, Zofran and Claritin. She complained of not sleeping well. Ativan was prescribed to take as needed but especially before bedtime to help with sleep. She will proceed to infusion tomorrow at [facility] and will return for Neulasta on Sunday. Subsequent infusions will be given on Fridays. Navigation will continue to follow.
no

## 2024-12-30 NOTE — H&P ADULT - NSHPPHYSICALEXAM_GEN_ALL_CORE
CONSTITUTIONAL: alert and cooperative, no acute distress.  EYES: PERRL, no scleral icterus  ENT: Mucosa moist, tongue normal  NECK: Neck supple, trachea midline, non-tender  CARDIAC: Normal S1 and S2. Regular rate and rhythms. No Pedal edema  LUNGS: Equal air entry both lungs. Bilateral diffused expiratory wheezing+. Increase respiratory effort.   ABDOMEN: Soft, nondistended, nontender. No guarding or rebound tenderness. No hepatomegaly or splenomegaly. Bowel sound normal.   MUSCULOSKELETAL: Normocephalic, atraumatic. No significant deformity or joint abnormality  NEUROLOGICAL: No gross motor or sensory deficits  SKIN: no lesions or eruptions. Normal turgor  PSYCHIATRIC: A&O x 3, appropriate mood and affect

## 2024-12-30 NOTE — ED PROVIDER NOTE - CLINICAL SUMMARY MEDICAL DECISION MAKING FREE TEXT BOX
83 y/o F w PMHx of HTN, HLD, asthma, 85 y/o F w PMHx of HTN, HLD, asthma Senting with shortness of breath.  Patient states she has had shortness of breath for 1 week with increased wheezing.  States been using her albuterol nebulizer daily.  States she went to her primary care physician's office and was found to be hypoxic and wheezing.  Patient took 1 DuoNeb treatment before arrival.  Denies any fevers chills chest pain abdominal pain nausea vomiting diarrhea.  Denies any recent travel or sick contacts.  States prior ICU admissions for asthma exacerbation with intubation many years ago.    Vital signs remarkable for hypoxia to 88%, tachycardic to 125 afebrile.  Patient with diffuse wheezing throughout all lung fields.      Plan for 3 DuoNebs back-to-back, steroids and magnesium.  Basic blood work cardiac markers and chest x-ray.  Differential diagnosis includes but not limited to acute asthma exacerbation vs infectious etiology rest metabolic derangement 83 y/o F w PMHx of HTN, HLD, asthma presenting with shortness of breath.  Patient states she has had shortness of breath for 1 week with increased wheezing.  States been using her albuterol nebulizer daily.  States she went to her primary care physician's office and was found to be hypoxic and wheezing.  Patient took 1 DuoNeb treatment before arrival.  Denies any fevers chills chest pain abdominal pain nausea vomiting diarrhea.  Denies any recent travel or sick contacts.  States prior ICU admissions for asthma exacerbation with intubation many years ago.    Vital signs remarkable for hypoxia to 88%, tachycardic to 125 afebrile.  Patient with diffuse wheezing throughout all lung fields.      Plan for 3 DuoNebs back-to-back, steroids and magnesium.  Basic blood work cardiac markers and chest x-ray.  Differential diagnosis includes but not limited to acute asthma exacerbation vs infectious etiology rest metabolic derangement      Attending note (Fermin): Please see my attending statement below.

## 2024-12-30 NOTE — PHARMACOTHERAPY INTERVENTION NOTE - COMMENTS
Recommended to decrease enoxaparin 40mg daily to enoxaparin 30mg daily for DVT ppx as patient weighs only 43kg.

## 2024-12-30 NOTE — ED PROVIDER NOTE - ATTENDING CONTRIBUTION TO CARE
Attending note (Fermin): difficulty breathing with hypoxia/ hypoxic respiratory failure, requiring multiple nebulized treatments, steroids and magnesium.      Attending Statement (WILLAM Christensen MD):    HPI: 84-year-old female history HTN HLD asthma presenting with worsening shortness of breath and wheezing over the past 1 week.  States has been using her albuterol multiple times per day with limited improvement and went to her PCPs office today where she was found to be hypoxic and sent to the ED.    Review of Systems:  -General: no fever or chills  -ENT: no congestion, no difficulty swallowing  -Pulmonary: +cough, +shortness of breath  -Cardiac: no chest pain, no palpitations  -Gastrointestinal: no abdominal pain, no nausea, no vomiting, and no diarrhea.  -Genitourinary: no blood or pain with urination  -Musculoskeletal: no back or neck pain  -Skin: no rashes  -Endocrine: No h/o diabetes   -Neurologic: No new weakness or numbness in extremities    All else negative unless otherwise specified elsewhere in this note.    PSH/PMH as noted above    On Physical Exam:  General: well appearing, in NAD, speaking clearly in full sentences and without difficulty; cooperative with exam  HEENT: anicteric sclera, airway patent  Neck: no JVD  Cardiac: tachycardic  Lungs: diffuse wheezing  Abdomen: soft nontender/nondistended  Skin: intact, no rash  Extremities: no peripheral edema, no gross deformities    MDM: Likely asthma exacerbation; possible inciting viral infection vs pneumonia; given tachypneic, tachycardic and hypoxic, does raise concern for sepsis; starting duonebs, steroids, magnesium, sending sepsis labs including cultures/flu-covid pcr; obtain CXR. start iv fluids and empiric antibiotics (ceftraixone and aizthromycin).  Anticipate need for admission; consider bipap or ICU if not improving after nebs.    ED Course: questionable opacities; significant improvement subjectively with nebs and meds, but still wheezing diffusely; will admit, no need for icu at this time.

## 2024-12-30 NOTE — ED ADULT NURSE NOTE - SUICIDE SCREENING QUESTION 1
Medication: Semaglutide 1 mg/dose     Last office visit date: 03/05/2024 (Continue current medications)    Medication Refill Protocol Failed.   Hgb A1c less than 8 within last 12 months looking at last value       Patient has a scheduled appointment on 7/10/2024 for her MWV.    No

## 2024-12-30 NOTE — H&P ADULT - HISTORY OF PRESENT ILLNESS
84 years old female with h/o HTN, HLD, asthma present to ED with complain of worsening SOB and wheezing. Patient reported dry cough, SOB and wheezing which started around 5 days ago, progressively worsenied over last 2-3 days. Patient was seen in PCP office, noted low oxygen sat and was sent to ED. No fever or chills. No known sick contact  Hypoxic to 88% at RA, improved with 2L NC, tachypneic, tachycardic. WBC 10.62, plt 326, Cr 0.6, lactate 1.4, proBNP 68. Flu/COVID/RSV negative

## 2024-12-30 NOTE — ED ADULT NURSE NOTE - BREATH SOUNDS, RIGHT
-- DO NOT REPLY / DO NOT REPLY ALL --  -- Message is from the [facility]--    COVID-19 Universal Screening: N/A - Not about scheduling    Transfer to RN      Chief Complaint:  Stomach pain patient constipated    Caller Information       Type Contact Phone    04/23/2021 09:10 PM CDT Phone (Incoming) NARESH FRIEDMAN (Mother) [phone]          Provider Name:  [facility] Site Name:  na    Alternative Phone Number:  na    
wheezes

## 2024-12-30 NOTE — ED ADULT NURSE NOTE - NSFALLHARMRISKINTERV_ED_ALL_ED

## 2024-12-31 LAB
ALBUMIN SERPL ELPH-MCNC: 2.8 G/DL — LOW (ref 3.3–5)
ALP SERPL-CCNC: 115 U/L — SIGNIFICANT CHANGE UP (ref 40–120)
ALT FLD-CCNC: 14 U/L — SIGNIFICANT CHANGE UP (ref 12–78)
ANION GAP SERPL CALC-SCNC: 4 MMOL/L — LOW (ref 5–17)
AST SERPL-CCNC: 12 U/L — LOW (ref 15–37)
BILIRUB SERPL-MCNC: 0.2 MG/DL — SIGNIFICANT CHANGE UP (ref 0.2–1.2)
BUN SERPL-MCNC: 19 MG/DL — SIGNIFICANT CHANGE UP (ref 7–23)
CALCIUM SERPL-MCNC: 8.5 MG/DL — SIGNIFICANT CHANGE UP (ref 8.5–10.1)
CHLORIDE SERPL-SCNC: 106 MMOL/L — SIGNIFICANT CHANGE UP (ref 96–108)
CO2 SERPL-SCNC: 29 MMOL/L — SIGNIFICANT CHANGE UP (ref 22–31)
CREAT SERPL-MCNC: 0.47 MG/DL — LOW (ref 0.5–1.3)
EGFR: 95 ML/MIN/1.73M2 — SIGNIFICANT CHANGE UP
GLUCOSE SERPL-MCNC: 139 MG/DL — HIGH (ref 70–99)
HCT VFR BLD CALC: 38.6 % — SIGNIFICANT CHANGE UP (ref 34.5–45)
HGB BLD-MCNC: 11.7 G/DL — SIGNIFICANT CHANGE UP (ref 11.5–15.5)
MAGNESIUM SERPL-MCNC: 2.4 MG/DL — SIGNIFICANT CHANGE UP (ref 1.6–2.6)
MCHC RBC-ENTMCNC: 24.7 PG — LOW (ref 27–34)
MCHC RBC-ENTMCNC: 30.3 G/DL — LOW (ref 32–36)
MCV RBC AUTO: 81.4 FL — SIGNIFICANT CHANGE UP (ref 80–100)
NRBC # BLD: 0 /100 WBCS — SIGNIFICANT CHANGE UP (ref 0–0)
PHOSPHATE SERPL-MCNC: 2.7 MG/DL — SIGNIFICANT CHANGE UP (ref 2.5–4.5)
PLATELET # BLD AUTO: 300 K/UL — SIGNIFICANT CHANGE UP (ref 150–400)
POTASSIUM SERPL-MCNC: 3.8 MMOL/L — SIGNIFICANT CHANGE UP (ref 3.5–5.3)
POTASSIUM SERPL-SCNC: 3.8 MMOL/L — SIGNIFICANT CHANGE UP (ref 3.5–5.3)
PROCALCITONIN SERPL-MCNC: 0.05 NG/ML — SIGNIFICANT CHANGE UP (ref 0.02–0.1)
PROT SERPL-MCNC: 7.3 GM/DL — SIGNIFICANT CHANGE UP (ref 6–8.3)
RBC # BLD: 4.74 M/UL — SIGNIFICANT CHANGE UP (ref 3.8–5.2)
RBC # FLD: 16.1 % — HIGH (ref 10.3–14.5)
SODIUM SERPL-SCNC: 139 MMOL/L — SIGNIFICANT CHANGE UP (ref 135–145)
WBC # BLD: 6.5 K/UL — SIGNIFICANT CHANGE UP (ref 3.8–10.5)
WBC # FLD AUTO: 6.5 K/UL — SIGNIFICANT CHANGE UP (ref 3.8–10.5)

## 2024-12-31 PROCEDURE — 99233 SBSQ HOSP IP/OBS HIGH 50: CPT

## 2024-12-31 RX ORDER — CEFTRIAXONE SODIUM 1 G/1
1000 INJECTION, POWDER, FOR SOLUTION INTRAMUSCULAR; INTRAVENOUS EVERY 24 HOURS
Refills: 0 | Status: COMPLETED | OUTPATIENT
Start: 2024-12-31 | End: 2025-01-04

## 2024-12-31 RX ORDER — GUAIFENESIN 100 MG/5ML
200 SYRUP ORAL EVERY 6 HOURS
Refills: 0 | Status: DISCONTINUED | OUTPATIENT
Start: 2024-12-31 | End: 2025-01-10

## 2024-12-31 RX ORDER — IPRATROPIUM BROMIDE AND ALBUTEROL SULFATE .5; 2.5 MG/3ML; MG/3ML
3 SOLUTION RESPIRATORY (INHALATION) ONCE
Refills: 0 | Status: COMPLETED | OUTPATIENT
Start: 2024-12-31 | End: 2024-12-31

## 2024-12-31 RX ORDER — PREDNISONE 5 MG
40 TABLET ORAL DAILY
Refills: 0 | Status: COMPLETED | OUTPATIENT
Start: 2025-01-01 | End: 2025-01-04

## 2024-12-31 RX ADMIN — CEFTRIAXONE SODIUM 100 MILLIGRAM(S): 1 INJECTION, POWDER, FOR SOLUTION INTRAMUSCULAR; INTRAVENOUS at 10:32

## 2024-12-31 RX ADMIN — MONTELUKAST SODIUM 10 MILLIGRAM(S): 10 TABLET, FILM COATED ORAL at 21:23

## 2024-12-31 RX ADMIN — IPRATROPIUM BROMIDE AND ALBUTEROL SULFATE 3 MILLILITER(S): .5; 2.5 SOLUTION RESPIRATORY (INHALATION) at 17:03

## 2024-12-31 RX ADMIN — IPRATROPIUM BROMIDE AND ALBUTEROL SULFATE 3 MILLILITER(S): .5; 2.5 SOLUTION RESPIRATORY (INHALATION) at 05:21

## 2024-12-31 RX ADMIN — ROSUVASTATIN 10 MILLIGRAM(S): 40 TABLET, FILM COATED ORAL at 21:23

## 2024-12-31 RX ADMIN — IPRATROPIUM BROMIDE AND ALBUTEROL SULFATE 3 MILLILITER(S): .5; 2.5 SOLUTION RESPIRATORY (INHALATION) at 19:08

## 2024-12-31 RX ADMIN — Medication 200 MILLIGRAM(S): at 21:23

## 2024-12-31 RX ADMIN — IPRATROPIUM BROMIDE AND ALBUTEROL SULFATE 3 MILLILITER(S): .5; 2.5 SOLUTION RESPIRATORY (INHALATION) at 11:09

## 2024-12-31 RX ADMIN — Medication 10 MILLIGRAM(S): at 05:41

## 2024-12-31 RX ADMIN — CLONIDINE HYDROCHLORIDE 0.1 MILLIGRAM(S): 0.2 TABLET ORAL at 05:41

## 2024-12-31 RX ADMIN — ENOXAPARIN SODIUM 30 MILLIGRAM(S): 60 INJECTION INTRAVENOUS; SUBCUTANEOUS at 17:10

## 2024-12-31 RX ADMIN — METHYLPREDNISOLONE 20 MILLIGRAM(S): 4 TABLET ORAL at 05:42

## 2024-12-31 NOTE — PROGRESS NOTE ADULT - PROBLEM SELECTOR PLAN 1
#CAP  hypoxic to 88% at RA, tachypneic, tachycardic. Improved with NC  CXR w/ L hilar consolidation will treat with CTX for empiric CAP coverage. Repeat CXR in 4-6 weeks to ensure resolution.   Received IV ceftriaxone, azithromycin, solumedrol 125mg, duoneb in ED  will transition to PO prednisone 40mg daily as patient is doing well and O2 weaned  Duoneb q6hr  check RVP negative   f/u strep, legionella, mycoplasma   Closely monitor respiratory status and wean off NC as tolerate

## 2024-12-31 NOTE — PROGRESS NOTE ADULT - SUBJECTIVE AND OBJECTIVE BOX
Patient is a 84y old  Female who presents with a chief complaint of hypoxic respiratory failure, asthma exacerbation (30 Dec 2024 14:05)      INTERVAL HPI/OVERNIGHT EVENTS:  -nothing acute overnight   -on 3L NC this AM will try to wean   -seen and examined at bedside doing well     MEDICATIONS  (STANDING):  albuterol/ipratropium for Nebulization 3 milliLiter(s) Nebulizer every 6 hours  amLODIPine   Tablet 10 milliGRAM(s) Oral daily  cefTRIAXone   IVPB 1000 milliGRAM(s) IV Intermittent every 24 hours  cloNIDine 0.1 milliGRAM(s) Oral daily  enoxaparin Injectable 30 milliGRAM(s) SubCutaneous every 24 hours  methylPREDNISolone sodium succinate Injectable 20 milliGRAM(s) IV Push every 8 hours  montelukast 10 milliGRAM(s) Oral daily  rosuvastatin 10 milliGRAM(s) Oral at bedtime  senna 2 Tablet(s) Oral at bedtime    MEDICATIONS  (PRN):  acetaminophen     Tablet .. 650 milliGRAM(s) Oral every 6 hours PRN Mild Pain (1 - 3), Moderate Pain (4 - 6)  benzonatate 100 milliGRAM(s) Oral three times a day PRN Cough  melatonin 3 milliGRAM(s) Oral at bedtime PRN Insomnia      Allergies    No Known Allergies    Intolerances        REVIEW OF SYSTEMS:  +productive cough   no sore throat congestion sob abdominal pain Le edema     Vital Signs Last 24 Hrs  T(C): 36.7 (31 Dec 2024 11:29), Max: 37.1 (30 Dec 2024 14:59)  T(F): 98 (31 Dec 2024 11:29), Max: 98.7 (30 Dec 2024 14:59)  HR: 88 (31 Dec 2024 11:29) (83 - 106)  BP: 105/69 (31 Dec 2024 11:29) (105/69 - 150/75)  BP(mean): --  RR: 18 (31 Dec 2024 11:29) (18 - 24)  SpO2: 95% (31 Dec 2024 11:29) (93% - 98%)    Parameters below as of 31 Dec 2024 11:29  Patient On (Oxygen Delivery Method): nasal cannula  O2 Flow (L/min): 1      PHYSICAL EXAM:  GENERAL: NAD  HEAD:  Atraumatic, Normocephalic  EYES: EOMI, sclera non-icteric  ENMT:  Moist mucous membranes  NERVOUS SYSTEM:  Alert & Oriented to name, situation, location  CHEST/LUNG: good air movement b/l wheezes b/l   HEART: Regular rate and rhythm; No murmurs, rubs, or gallops  ABDOMEN: Soft, Nontender, Nondistended; Bowel sounds present  EXTREMITIES:  2+ Peripheral Pulses, no LE edema   SKIN: warm      LABS:                        11.7   6.50  )-----------( 300      ( 31 Dec 2024 05:40 )             38.6     12-31    139  |  106  |  19  ----------------------------<  139[H]  3.8   |  29  |  0.47[L]    Ca    8.5      31 Dec 2024 05:40  Phos  2.7     12-31  Mg     2.4     12-31    TPro  7.3  /  Alb  2.8[L]  /  TBili  0.2  /  DBili  x   /  AST  12[L]  /  ALT  14  /  AlkPhos  115  12-31    PT/INR - ( 30 Dec 2024 11:27 )   PT: 13.0 sec;   INR: 1.15 ratio         PTT - ( 30 Dec 2024 11:27 )  PTT:28.8 sec  Urinalysis Basic - ( 31 Dec 2024 05:40 )    Color: x / Appearance: x / SG: x / pH: x  Gluc: 139 mg/dL / Ketone: x  / Bili: x / Urobili: x   Blood: x / Protein: x / Nitrite: x   Leuk Esterase: x / RBC: x / WBC x   Sq Epi: x / Non Sq Epi: x / Bacteria: x      CAPILLARY BLOOD GLUCOSE          RADIOLOGY & ADDITIONAL TESTS:    Imaging Personally Reviewed:  [ X] YES  [ ] NO    Consultant(s) Notes Reviewed:  [ X] YES  [ ] NO    Care Discussed with Consultants/Other Providers [X ] YES  [ ] NO

## 2024-12-31 NOTE — PROGRESS NOTE ADULT - ASSESSMENT
84 years old female with h/o HTN, HLD, asthma present to ED with complain of worsening SOB and wheezing. Patient reported dry cough, SOB and wheezing which started around 5 days ago, progressively worsenied over last 2-3 days. Patient was seen in PCP office, noted low oxygen sat and was sent to ED. No fever or chills. No known sick contact  Hypoxic to 88% at RA, improved with 2L NC, tachypneic, tachycardic. WBC 10.62, plt 326, Cr 0.6, lactate 1.4, proBNP 68. Flu/COVID/RSV negative within normal limits

## 2025-01-01 LAB
ANION GAP SERPL CALC-SCNC: 1 MMOL/L — LOW (ref 5–17)
BUN SERPL-MCNC: 26 MG/DL — HIGH (ref 7–23)
CALCIUM SERPL-MCNC: 8.8 MG/DL — SIGNIFICANT CHANGE UP (ref 8.5–10.1)
CHLORIDE SERPL-SCNC: 106 MMOL/L — SIGNIFICANT CHANGE UP (ref 96–108)
CO2 SERPL-SCNC: 31 MMOL/L — SIGNIFICANT CHANGE UP (ref 22–31)
CREAT SERPL-MCNC: 0.36 MG/DL — LOW (ref 0.5–1.3)
EGFR: 101 ML/MIN/1.73M2 — SIGNIFICANT CHANGE UP
GLUCOSE SERPL-MCNC: 92 MG/DL — SIGNIFICANT CHANGE UP (ref 70–99)
HCT VFR BLD CALC: 38.5 % — SIGNIFICANT CHANGE UP (ref 34.5–45)
HGB BLD-MCNC: 11.7 G/DL — SIGNIFICANT CHANGE UP (ref 11.5–15.5)
LEGIONELLA AG UR QL: NEGATIVE — SIGNIFICANT CHANGE UP
MAGNESIUM SERPL-MCNC: 2.4 MG/DL — SIGNIFICANT CHANGE UP (ref 1.6–2.6)
MCHC RBC-ENTMCNC: 24.9 PG — LOW (ref 27–34)
MCHC RBC-ENTMCNC: 30.4 G/DL — LOW (ref 32–36)
MCV RBC AUTO: 82.1 FL — SIGNIFICANT CHANGE UP (ref 80–100)
NRBC # BLD: 0 /100 WBCS — SIGNIFICANT CHANGE UP (ref 0–0)
PHOSPHATE SERPL-MCNC: 2.6 MG/DL — SIGNIFICANT CHANGE UP (ref 2.5–4.5)
PLATELET # BLD AUTO: 306 K/UL — SIGNIFICANT CHANGE UP (ref 150–400)
POTASSIUM SERPL-MCNC: 4 MMOL/L — SIGNIFICANT CHANGE UP (ref 3.5–5.3)
POTASSIUM SERPL-SCNC: 4 MMOL/L — SIGNIFICANT CHANGE UP (ref 3.5–5.3)
RBC # BLD: 4.69 M/UL — SIGNIFICANT CHANGE UP (ref 3.8–5.2)
RBC # FLD: 16.5 % — HIGH (ref 10.3–14.5)
S PNEUM AG UR QL: NEGATIVE — SIGNIFICANT CHANGE UP
SODIUM SERPL-SCNC: 138 MMOL/L — SIGNIFICANT CHANGE UP (ref 135–145)
WBC # BLD: 12.67 K/UL — HIGH (ref 3.8–10.5)
WBC # FLD AUTO: 12.67 K/UL — HIGH (ref 3.8–10.5)

## 2025-01-01 PROCEDURE — 99233 SBSQ HOSP IP/OBS HIGH 50: CPT

## 2025-01-01 RX ADMIN — IPRATROPIUM BROMIDE AND ALBUTEROL SULFATE 3 MILLILITER(S): .5; 2.5 SOLUTION RESPIRATORY (INHALATION) at 17:00

## 2025-01-01 RX ADMIN — MONTELUKAST SODIUM 10 MILLIGRAM(S): 10 TABLET, FILM COATED ORAL at 21:18

## 2025-01-01 RX ADMIN — Medication 40 MILLIGRAM(S): at 06:07

## 2025-01-01 RX ADMIN — IPRATROPIUM BROMIDE AND ALBUTEROL SULFATE 3 MILLILITER(S): .5; 2.5 SOLUTION RESPIRATORY (INHALATION) at 01:01

## 2025-01-01 RX ADMIN — IPRATROPIUM BROMIDE AND ALBUTEROL SULFATE 3 MILLILITER(S): .5; 2.5 SOLUTION RESPIRATORY (INHALATION) at 23:07

## 2025-01-01 RX ADMIN — CEFTRIAXONE SODIUM 100 MILLIGRAM(S): 1 INJECTION, POWDER, FOR SOLUTION INTRAMUSCULAR; INTRAVENOUS at 10:59

## 2025-01-01 RX ADMIN — ROSUVASTATIN 10 MILLIGRAM(S): 40 TABLET, FILM COATED ORAL at 21:18

## 2025-01-01 RX ADMIN — IPRATROPIUM BROMIDE AND ALBUTEROL SULFATE 3 MILLILITER(S): .5; 2.5 SOLUTION RESPIRATORY (INHALATION) at 05:54

## 2025-01-01 RX ADMIN — Medication 3 MILLIGRAM(S): at 21:18

## 2025-01-01 RX ADMIN — ENOXAPARIN SODIUM 30 MILLIGRAM(S): 60 INJECTION INTRAVENOUS; SUBCUTANEOUS at 16:48

## 2025-01-01 RX ADMIN — Medication 100 MILLIGRAM(S): at 18:47

## 2025-01-01 RX ADMIN — Medication 200 MILLIGRAM(S): at 06:28

## 2025-01-01 RX ADMIN — Medication 200 MILLIGRAM(S): at 18:49

## 2025-01-01 RX ADMIN — IPRATROPIUM BROMIDE AND ALBUTEROL SULFATE 3 MILLILITER(S): .5; 2.5 SOLUTION RESPIRATORY (INHALATION) at 11:00

## 2025-01-01 RX ADMIN — Medication 200 MILLIGRAM(S): at 12:44

## 2025-01-01 NOTE — PROGRESS NOTE ADULT - SUBJECTIVE AND OBJECTIVE BOX
Patient: JESSENIA CANO [id number] 84y Female                            Hospitalist Attending Note    No complaints.  Reports improving since admission.  Still on O2 1L  ____________________PHYSICAL EXAM:  GENERAL:  NAD Alert and Oriented x 3   HEENT: NCAT  CARDIOVASCULAR:  S1, S2  LUNGS: coarse BS b/l.  Mild wheezing.   ABDOMEN:  soft, (-) tenderness, (-) distension, (+) bowel sounds, (-) guarding, (-) rebound (-) rigidity  EXTREMITIES:  no cyanosis / clubbing / edema.   ____________________     VITALS:  Vital Signs Last 24 Hrs  T(C): 36.7 (2025 11:29), Max: 37.3 (31 Dec 2024 16:49)  T(F): 98.1 (2025 11:29), Max: 99.1 (31 Dec 2024 16:49)  HR: 100 (2025 11:29) (80 - 100)  BP: 106/65 (2025 11:29) (106/65 - 114/70)  BP(mean): --  RR: 20 (2025 11:29) (18 - 20)  SpO2: 94% (2025 11:29) (94% - 98%)    Parameters below as of 2025 11:29  Patient On (Oxygen Delivery Method): nasal cannula     Daily     Daily   CAPILLARY BLOOD GLUCOSE        I&O's Summary    31 Dec 2024 07:01  -  2025 07:00  --------------------------------------------------------  IN: 50 mL / OUT: 300 mL / NET: -250 mL        HISTORY:  PAST MEDICAL & SURGICAL HISTORY:  HTN (hypertension)      Asthma      Osteoporosis      High cholesterol      Hypertension      Hyperlipidemia      Asthma       delivery delivered  x1      S/P hip hemiarthroplasty  right hip surgery      S/P hernia surgery  left inguinal hernia repair      Allergies    No Known Allergies    Intolerances       LABS:                        11.7   12.67 )-----------( 306      ( 2025 06:00 )             38.5       Urinalysis with Rflx Culture (collected 30 Dec 2024 14:45)    Culture - Blood (collected 30 Dec 2024 11:27)  Source: .Blood BLOOD  Preliminary Report (2025 15:01):    No growth at 48 Hours    Culture - Blood (collected 30 Dec 2024 11:27)  Source: .Blood BLOOD  Preliminary Report (2025 15:01):    No growth at 48 Hours        138  |  106  |  26[H]  ----------------------------<  92  4.0   |  31  |  0.36[L]    Ca    8.8      2025 06:00  Phos  2.6       Mg     2.4         TPro  7.3  /  Alb  2.8[L]  /  TBili  0.2  /  DBili  x   /  AST  12[L]  /  ALT  14  /  AlkPhos  115  12-31      LIVER FUNCTIONS - ( 31 Dec 2024 05:40 )  Alb: 2.8 g/dL / Pro: 7.3 gm/dL / ALK PHOS: 115 U/L / ALT: 14 U/L / AST: 12 U/L / GGT: x           Urinalysis Basic - ( 2025 06:00 )    Color: x / Appearance: x / SG: x / pH: x  Gluc: 92 mg/dL / Ketone: x  / Bili: x / Urobili: x   Blood: x / Protein: x / Nitrite: x   Leuk Esterase: x / RBC: x / WBC x   Sq Epi: x / Non Sq Epi: x / Bacteria: x          Urinalysis with Rflx Culture (collected 30 Dec 2024 14:45)    Culture - Blood (collected 30 Dec 2024 11:27)  Source: .Blood BLOOD  Preliminary Report (2025 15:01):    No growth at 48 Hours    Culture - Blood (collected 30 Dec 2024 11:27)  Source: .Blood BLOOD  Preliminary Report (2025 15:01):    No growth at 48 Hours          MEDICATIONS:  MEDICATIONS  (STANDING):  albuterol/ipratropium for Nebulization 3 milliLiter(s) Nebulizer every 6 hours  amLODIPine   Tablet 10 milliGRAM(s) Oral daily  cefTRIAXone   IVPB 1000 milliGRAM(s) IV Intermittent every 24 hours  cloNIDine 0.1 milliGRAM(s) Oral daily  enoxaparin Injectable 30 milliGRAM(s) SubCutaneous every 24 hours  montelukast 10 milliGRAM(s) Oral daily  predniSONE   Tablet 40 milliGRAM(s) Oral daily  rosuvastatin 10 milliGRAM(s) Oral at bedtime  senna 2 Tablet(s) Oral at bedtime    MEDICATIONS  (PRN):  acetaminophen     Tablet .. 650 milliGRAM(s) Oral every 6 hours PRN Mild Pain (1 - 3), Moderate Pain (4 - 6)  benzonatate 100 milliGRAM(s) Oral three times a day PRN Cough  guaiFENesin Oral Liquid (Sugar-Free) 200 milliGRAM(s) Oral every 6 hours PRN Cough  melatonin 3 milliGRAM(s) Oral at bedtime PRN Insomnia

## 2025-01-01 NOTE — PROGRESS NOTE ADULT - ASSESSMENT
84 years old female with h/o HTN, HLD, asthma present to ED with complain of worsening SOB and wheezing.    < from: Xray Chest 1 View- PORTABLE-Urgent (Xray Chest 1 View- PORTABLE-Urgent .) (12.30.24 @ 12:23) >  Left hilar infiltrate/atelectasis..   < end of copied text >      # Acute Respiratory Failure with Hypoxia - Remains on O2 at 1L / min.  Titrate off O2 as tolerated.  # Pneumonia - CXR (I personally reviewed) w/ L hilar consolidation.  Continue Abx.  I d/w patient to have repeat CXR in 4-6 weeks to ensure resolution. RVP negative f/u strep, legionella, mycoplasma   # Asthma with Acute Exacerbation - Continues to have wheeze.  Continue steroids, nebulizers prn.  Singulair.    # Essential HTN - Monitor BP.  Continue antihypertensives.  # Hyperlipidemia - diet modification.  Continue Statin.  # Inpatient DVT Prophylaxis - Lovenox subcut   84 years old female with h/o HTN, HLD, asthma present to ED with complain of worsening SOB and wheezing.    < from: Xray Chest 1 View- PORTABLE-Urgent (Xray Chest 1 View- PORTABLE-Urgent .) (12.30.24 @ 12:23) >  Left hilar infiltrate/atelectasis..   < end of copied text >      # Acute Respiratory Failure with Hypoxia - Remains on O2 at 1L / min.  Titrate off O2 as tolerated.  # Pneumonia - CXR (I personally reviewed) w/ L hilar consolidation.  Continue Abx.  I d/w patient to have repeat CXR in 4-6 weeks to ensure resolution. RVP negative f/u strep, legionella, mycoplasma.  Rise in WBC to 12 likely reflects steroid use, pt clinically improving.   # Asthma with Acute Exacerbation - Continues to have wheeze.  Continue steroids, nebulizers prn.  Singulair.    # Essential HTN - Monitor BP.  Continue antihypertensives.  # Hyperlipidemia - diet modification.  Continue Statin.  # Inpatient DVT Prophylaxis - Lovenox subcut

## 2025-01-02 LAB
M PNEUMO IGM SER-ACNC: 0.15 INDEX — SIGNIFICANT CHANGE UP (ref 0–0.9)
MYCOPLASMA AG SPEC QL: NEGATIVE — SIGNIFICANT CHANGE UP

## 2025-01-02 PROCEDURE — 99232 SBSQ HOSP IP/OBS MODERATE 35: CPT

## 2025-01-02 RX ORDER — METHYLPREDNISOLONE 4 MG/1
40 TABLET ORAL ONCE
Refills: 0 | Status: COMPLETED | OUTPATIENT
Start: 2025-01-02 | End: 2025-01-02

## 2025-01-02 RX ADMIN — IPRATROPIUM BROMIDE AND ALBUTEROL SULFATE 3 MILLILITER(S): .5; 2.5 SOLUTION RESPIRATORY (INHALATION) at 17:36

## 2025-01-02 RX ADMIN — Medication 40 MILLIGRAM(S): at 05:32

## 2025-01-02 RX ADMIN — METHYLPREDNISOLONE 40 MILLIGRAM(S): 4 TABLET ORAL at 22:29

## 2025-01-02 RX ADMIN — ROSUVASTATIN 10 MILLIGRAM(S): 40 TABLET, FILM COATED ORAL at 21:48

## 2025-01-02 RX ADMIN — IPRATROPIUM BROMIDE AND ALBUTEROL SULFATE 3 MILLILITER(S): .5; 2.5 SOLUTION RESPIRATORY (INHALATION) at 12:07

## 2025-01-02 RX ADMIN — CEFTRIAXONE SODIUM 100 MILLIGRAM(S): 1 INJECTION, POWDER, FOR SOLUTION INTRAMUSCULAR; INTRAVENOUS at 09:52

## 2025-01-02 RX ADMIN — Medication 100 MILLIGRAM(S): at 18:12

## 2025-01-02 RX ADMIN — IPRATROPIUM BROMIDE AND ALBUTEROL SULFATE 3 MILLILITER(S): .5; 2.5 SOLUTION RESPIRATORY (INHALATION) at 23:23

## 2025-01-02 RX ADMIN — MONTELUKAST SODIUM 10 MILLIGRAM(S): 10 TABLET, FILM COATED ORAL at 21:48

## 2025-01-02 RX ADMIN — Medication 200 MILLIGRAM(S): at 18:13

## 2025-01-02 RX ADMIN — ENOXAPARIN SODIUM 30 MILLIGRAM(S): 60 INJECTION INTRAVENOUS; SUBCUTANEOUS at 17:40

## 2025-01-02 RX ADMIN — Medication 10 MILLIGRAM(S): at 05:32

## 2025-01-02 RX ADMIN — Medication 200 MILLIGRAM(S): at 05:35

## 2025-01-02 RX ADMIN — CLONIDINE HYDROCHLORIDE 0.1 MILLIGRAM(S): 0.2 TABLET ORAL at 05:32

## 2025-01-02 RX ADMIN — IPRATROPIUM BROMIDE AND ALBUTEROL SULFATE 3 MILLILITER(S): .5; 2.5 SOLUTION RESPIRATORY (INHALATION) at 05:03

## 2025-01-02 NOTE — PROGRESS NOTE ADULT - SUBJECTIVE AND OBJECTIVE BOX
Patient: JESSENIA CANO [id number] 84y Female                            Hospitalist Attending Note    No complaints.  Still with cough, wheezing.  Some SOB overnight.   On O2 3L  ____________________PHYSICAL EXAM:  GENERAL:  NAD Alert and Oriented x 3   HEENT: NCAT  CARDIOVASCULAR:  S1, S2  LUNGS: coarse BS b/l.  Mild wheezing.   ABDOMEN:  soft, (-) tenderness, (-) distension, (+) bowel sounds, (-) guarding, (-) rebound (-) rigidity  EXTREMITIES:  no cyanosis / clubbing / edema.   ____________________      VITALS:  Vital Signs Last 24 Hrs  T(C): 36.7 (02 Jan 2025 17:23), Max: 36.7 (02 Jan 2025 17:23)  T(F): 98.1 (02 Jan 2025 17:23), Max: 98.1 (02 Jan 2025 17:23)  HR: 101 (02 Jan 2025 17:23) (71 - 105)  BP: 110/75 (02 Jan 2025 17:23) (110/75 - 144/82)  BP(mean): --  RR: 18 (02 Jan 2025 17:23) (18 - 19)  SpO2: 96% (02 Jan 2025 17:23) (94% - 99%)    Parameters below as of 02 Jan 2025 14:59  Patient On (Oxygen Delivery Method): nasal cannula  O2 Flow (L/min): 3   Daily     Daily   CAPILLARY BLOOD GLUCOSE        I&O's Summary    01 Jan 2025 07:01  -  02 Jan 2025 07:00  --------------------------------------------------------  IN: 0 mL / OUT: 1100 mL / NET: -1100 mL    02 Jan 2025 07:01  -  02 Jan 2025 18:14  --------------------------------------------------------  IN: 540 mL / OUT: 400 mL / NET: 140 mL        LABS:                        11.7   12.67 )-----------( 306      ( 01 Jan 2025 06:00 )             38.5     01-01    138  |  106  |  26[H]  ----------------------------<  92  4.0   |  31  |  0.36[L]    Ca    8.8      01 Jan 2025 06:00  Phos  2.6     01-01  Mg     2.4     01-01          Urinalysis Basic - ( 01 Jan 2025 06:00 )    Color: x / Appearance: x / SG: x / pH: x  Gluc: 92 mg/dL / Ketone: x  / Bili: x / Urobili: x   Blood: x / Protein: x / Nitrite: x   Leuk Esterase: x / RBC: x / WBC x   Sq Epi: x / Non Sq Epi: x / Bacteria: x              MEDICATIONS:  acetaminophen     Tablet .. 650 milliGRAM(s) Oral every 6 hours PRN  albuterol/ipratropium for Nebulization 3 milliLiter(s) Nebulizer every 6 hours  amLODIPine   Tablet 10 milliGRAM(s) Oral daily  benzonatate 100 milliGRAM(s) Oral three times a day PRN  cefTRIAXone   IVPB 1000 milliGRAM(s) IV Intermittent every 24 hours  enoxaparin Injectable 30 milliGRAM(s) SubCutaneous every 24 hours  guaiFENesin Oral Liquid (Sugar-Free) 200 milliGRAM(s) Oral every 6 hours PRN  melatonin 3 milliGRAM(s) Oral at bedtime PRN  methylPREDNISolone sodium succinate Injectable 40 milliGRAM(s) IV Push once  montelukast 10 milliGRAM(s) Oral daily  predniSONE   Tablet 40 milliGRAM(s) Oral daily  rosuvastatin 10 milliGRAM(s) Oral at bedtime  senna 2 Tablet(s) Oral at bedtime

## 2025-01-02 NOTE — PROGRESS NOTE ADULT - ASSESSMENT
84 years old female with h/o HTN, HLD, asthma present to ED with complain of worsening SOB and wheezing.    < from: Xray Chest 1 View- PORTABLE-Urgent (Xray Chest 1 View- PORTABLE-Urgent .) (12.30.24 @ 12:23) >  Left hilar infiltrate/atelectasis..   < end of copied text >      # Acute Respiratory Failure with Hypoxia - Remains on O2 at 1L / min.  Titrate off O2 as tolerated.  # Pneumonia - CXR (I personally reviewed) w/ L hilar consolidation.  Continue Abx.  I d/w patient to have repeat CXR in 4-6 weeks to ensure resolution. RVP Strep, Mycoplasma, Legionella, BCx all negative.  Rise in WBC to 12 likely reflects steroid use, pt clinically improving.   # Asthma with Acute Exacerbation - Continues to have wheeze.  Continue steroids, nebulizers prn.  Singulair.  Solumedrol x 1.  If no improvement, would consult pulmonary.     # Essential HTN - Monitor BP.  Continue antihypertensives.  # Hyperlipidemia - diet modification.  Continue Statin.  # Inpatient DVT Prophylaxis - Lovenox subcut

## 2025-01-02 NOTE — PHYSICAL THERAPY INITIAL EVALUATION ADULT - IMPAIRMENTS FOUND, PT EVAL
aerobic capacity/endurance/arousal, attention, and cognition/decreased midline orientation/ergonomics and body mechanics/gait, locomotion, and balance/muscle strength/ventilation and respiration/gas exchange

## 2025-01-02 NOTE — PHYSICAL THERAPY INITIAL EVALUATION ADULT - ADDITIONAL COMMENTS
Pt states she lives with her daughters / spouse in a  [address] with [facility], and can reside on main level. Pt states she is independent with functional mobility using a RW, also has a cane. Pt requires some assistance with ADL's (from daughter) since hip fx / surgery last year.

## 2025-01-02 NOTE — PHYSICAL THERAPY INITIAL EVALUATION ADULT - ACTIVE RANGE OF MOTION EXAMINATION, REHAB EVAL
R LE LLD/bilateral upper extremity Active ROM was WFL (within functional limits)/bilateral  lower extremity Active ROM was WFL (within functional limits)

## 2025-01-02 NOTE — PHYSICAL THERAPY INITIAL EVALUATION ADULT - PERTINENT HX OF CURRENT PROBLEM, REHAB EVAL
84 years old female with h/o HTN, HLD, asthma present to ED with complain of worsening SOB and wheezing.

## 2025-01-03 PROCEDURE — 99223 1ST HOSP IP/OBS HIGH 75: CPT

## 2025-01-03 PROCEDURE — 71250 CT THORAX DX C-: CPT | Mod: 26

## 2025-01-03 PROCEDURE — 99232 SBSQ HOSP IP/OBS MODERATE 35: CPT

## 2025-01-03 RX ORDER — FERROUS SULFATE 325(65) MG
325 TABLET ORAL
Refills: 0 | Status: COMPLETED | OUTPATIENT
Start: 2025-01-03 | End: 2025-01-07

## 2025-01-03 RX ORDER — PANTOPRAZOLE 40 MG/1
40 TABLET, DELAYED RELEASE ORAL
Refills: 0 | Status: DISCONTINUED | OUTPATIENT
Start: 2025-01-04 | End: 2025-01-10

## 2025-01-03 RX ORDER — FAMOTIDINE 20 MG/1
20 TABLET, FILM COATED ORAL ONCE
Refills: 0 | Status: COMPLETED | OUTPATIENT
Start: 2025-01-03 | End: 2025-01-03

## 2025-01-03 RX ORDER — CYANOCOBALAMIN 1000 UG/ML
1000 INJECTION, SOLUTION INTRAMUSCULAR; SUBCUTANEOUS DAILY
Refills: 0 | Status: DISCONTINUED | OUTPATIENT
Start: 2025-01-03 | End: 2025-01-10

## 2025-01-03 RX ORDER — FLUTICASONE PROPIONATE AND SALMETEROL 50; 500 UG/1; UG/1
1 POWDER ORAL; RESPIRATORY (INHALATION)
Refills: 0 | Status: DISCONTINUED | OUTPATIENT
Start: 2025-01-03 | End: 2025-01-10

## 2025-01-03 RX ORDER — CALCIUM CARBONATE/VITAMIN D3 500MG-5MCG
1 TABLET ORAL DAILY
Refills: 0 | Status: DISCONTINUED | OUTPATIENT
Start: 2025-01-03 | End: 2025-01-10

## 2025-01-03 RX ORDER — MAG HYDROX/ALUMINUM HYD/SIMETH 200-200-20
30 SUSPENSION, ORAL (FINAL DOSE FORM) ORAL EVERY 6 HOURS
Refills: 0 | Status: DISCONTINUED | OUTPATIENT
Start: 2025-01-03 | End: 2025-01-10

## 2025-01-03 RX ADMIN — Medication 40 MILLIGRAM(S): at 05:38

## 2025-01-03 RX ADMIN — CEFTRIAXONE SODIUM 100 MILLIGRAM(S): 1 INJECTION, POWDER, FOR SOLUTION INTRAMUSCULAR; INTRAVENOUS at 10:00

## 2025-01-03 RX ADMIN — Medication 10 MILLIGRAM(S): at 05:38

## 2025-01-03 RX ADMIN — Medication 3 MILLIGRAM(S): at 00:17

## 2025-01-03 RX ADMIN — MONTELUKAST SODIUM 10 MILLIGRAM(S): 10 TABLET, FILM COATED ORAL at 21:59

## 2025-01-03 RX ADMIN — IPRATROPIUM BROMIDE AND ALBUTEROL SULFATE 3 MILLILITER(S): .5; 2.5 SOLUTION RESPIRATORY (INHALATION) at 17:07

## 2025-01-03 RX ADMIN — IPRATROPIUM BROMIDE AND ALBUTEROL SULFATE 3 MILLILITER(S): .5; 2.5 SOLUTION RESPIRATORY (INHALATION) at 12:06

## 2025-01-03 RX ADMIN — IPRATROPIUM BROMIDE AND ALBUTEROL SULFATE 3 MILLILITER(S): .5; 2.5 SOLUTION RESPIRATORY (INHALATION) at 23:28

## 2025-01-03 RX ADMIN — Medication 1 TABLET(S): at 11:39

## 2025-01-03 RX ADMIN — ROSUVASTATIN 10 MILLIGRAM(S): 40 TABLET, FILM COATED ORAL at 21:59

## 2025-01-03 RX ADMIN — SENNOSIDES 2 TABLET(S): 8.6 TABLET, FILM COATED ORAL at 21:59

## 2025-01-03 RX ADMIN — ENOXAPARIN SODIUM 30 MILLIGRAM(S): 60 INJECTION INTRAVENOUS; SUBCUTANEOUS at 16:39

## 2025-01-03 RX ADMIN — IPRATROPIUM BROMIDE AND ALBUTEROL SULFATE 3 MILLILITER(S): .5; 2.5 SOLUTION RESPIRATORY (INHALATION) at 05:55

## 2025-01-03 RX ADMIN — FAMOTIDINE 20 MILLIGRAM(S): 20 TABLET, FILM COATED ORAL at 08:30

## 2025-01-03 RX ADMIN — CYANOCOBALAMIN 1000 MICROGRAM(S): 1000 INJECTION, SOLUTION INTRAMUSCULAR; SUBCUTANEOUS at 11:39

## 2025-01-03 RX ADMIN — Medication 100 MILLIGRAM(S): at 21:59

## 2025-01-03 NOTE — PROGRESS NOTE ADULT - ASSESSMENT
84 years old female with h/o HTN, HLD, asthma present to ED with complain of worsening SOB and wheezing.    < from: Xray Chest 1 View- PORTABLE-Urgent (Xray Chest 1 View- PORTABLE-Urgent .) (12.30.24 @ 12:23) >  Left hilar infiltrate/atelectasis..   < end of copied text >      # Acute Respiratory Failure with Hypoxia - Remains on O2 at 1L / min.  Titrate off O2 as tolerated.  # Pneumonia - CXR (I personally reviewed) w/ L hilar consolidation.  Continue Abx.  I d/w patient to have repeat CXR in 4-6 weeks to ensure resolution. RVP Strep, Mycoplasma, Legionella, BCx all negative.  Rise in WBC to 12 likely reflects steroid use, pt clinically improving.   # Asthma with Acute Exacerbation - Continues to have wheeze.  Continue steroids, nebulizers prn.  Singulair.  Added Advair.  Pulmonary input appreciated.    # Essential HTN - Monitor BP.  Continue antihypertensives.  # Hyperlipidemia - diet modification.  Continue Statin.  # Inpatient DVT Prophylaxis - Lovenox subcut

## 2025-01-03 NOTE — CONSULT NOTE ADULT - ASSESSMENT
84 years old female with h/o HTN, HLD, asthma present to ED with complain of worsening SOB and wheezing. Patient reported dry cough, SOB and wheezing which started around 5 days ago, progressively worsenied over last 2-3 days. Patient was seen in PCP office, noted low oxygen sat and was sent to ED. No fever or chills. Hypoxic to 88% at RA. WBC 10.62, plt 326, Cr 0.6, lactate 1.4, proBNP 68. Flu/COVID/RSV negative. Admitted to medicine with left PNA, Asthma exacerbation. Pulmonary consulted.     DX: Acute hypoxemic respiratory failure, PNA, Asthma Exacerbation, Bronchiectasis.     Reccs:    - Acute hypoxemic respiratory failure likely from PNA and asmtha exacerbation   - On room air today with SpO2 96%. No wheezing today, however seen after received duoneb tx  - Supplemental O2 PRN as needed for goal O2 > 92%  - CXR with Left PNA, bronchiectasis like pattern- would obtain CT chest for further eval. F/U CT chest  - Continue with CAP coverage  - Mycoplasma, Legionella, Strep, Flu/covid/RSV, negative  - Blood cultures NTD  - Does have long standing history of asthma, no exacerbation in 19 years states.   - C/W duonebs q 6 for now, singulair  - Will add Advair while wheezing   - C/W PO prednisone  - C/W PPI while on corticosteroids  - Rest of care per primary team     Plan discussed with Dr. Ortega.

## 2025-01-03 NOTE — CONSULT NOTE ADULT - NS ATTEND AMEND GEN_ALL_CORE FT
84 years old female with h/o HTN, HLD, asthma p/w asthma exacerbation in setting of PNA    Pt reports asthma dz age 30s but not active for her for decades. Reports improvement with allergy shots.  Has significant cough and wheezing with some improvement with tx. Does have gerd that may be contributing  CXR does shows dilated airways poss bronchiectasis can get ct chest to eval      - Supplemental O2 PRN as needed for goal O2 > 92%, wean down as tolerated  - check CT chest  - Continue with  abx,   - start laba/ics  - cont steroids  - cont duonebs  - use PPI while on corticosteroids  - dvt ppx

## 2025-01-03 NOTE — CONSULT NOTE ADULT - SUBJECTIVE AND OBJECTIVE BOX
HPI:  84 years old female with h/o HTN, HLD, asthma present to ED with complain of worsening SOB and wheezing. Patient reported dry cough, SOB and wheezing which started around 5 days ago, progressively worsenied over last 2-3 days. Patient was seen in PCP office, noted low oxygen sat and was sent to ED. No fever or chills. No known sick contact  Hypoxic to 88% at RA, improved with 2L NC, tachypneic, tachycardic. WBC 10.62, plt 326, Cr 0.6, lactate 1.4, proBNP 68. Flu/COVID/RSV negative (30 Dec 2024 14:05)      PAST MEDICAL & SURGICAL HISTORY:  HTN (hypertension)      Asthma      Osteoporosis      High cholesterol      Hypertension      Hyperlipidemia      Asthma       delivery delivered  x1      S/P hip hemiarthroplasty  right hip surgery      S/P hernia surgery  left inguinal hernia repair          FAMILY HISTORY:      SOCIAL HISTORY:  Smoking: [x ] Never Smoked [ ] Former Smoker (__ packs x ___ years) [ ] Current Smoker  (__ packs x ___ years)  Substance Use: [x ] Never Used [ ] Used ____  EtOH Use: denies  Marital Status: [ ] Single [ ]  [ ]  [ ]   Sexual History:   Occupation:  Recent Travel:  Country of Birth:  Advance Directives:    Allergies    No Known Allergies    Intolerances        HOME MEDICATIONS:    REVIEW OF SYSTEMS:  Constitutional: [ x] negative [ ] fevers [ ] chills [ ] weight loss [ ] weight gain  HEENT: [x ] negative [ ] dry eyes [ ] eye irritation [ ] postnasal drip [ ] nasal congestion  CV: [x ] negative  [ ] chest pain [ ] orthopnea [ ] palpitations [ ] murmur  Resp: [ ] negative [x ] cough [x ] shortness of breath [x ] dyspnea [ x] wheezing [ x] sputum [ ] hemoptysis  GI: [x ] negative [ ] nausea [ ] vomiting [ ] diarrhea [ ] constipation [ ] abd pain [ ] dysphagia   : [x ] negative [ ] dysuria [ ] nocturia [ ] hematuria [ ] increased urinary frequency  Musculoskeletal: [x ] negative [ ] back pain [ ] myalgias [ ] arthralgias [ ] fracture  Skin: [x ] negative [ ] rash [ ] itch  Neurological: [ x] negative [ ] headache [ ] dizziness [ ] syncope [ ] weakness [ ] numbness  Psychiatric: [ x] negative [ ] anxiety [ ] depression  Endocrine: [x ] negative [ ] diabetes [ ] thyroid problem  Hematologic/Lymphatic: [x ] negative [ ] anemia [ ] bleeding problem  Allergic/Immunologic: [x ] negative [ ] itchy eyes [ ] nasal discharge [ ] hives [ ] angioedema  [ ] All other systems negative  [ ] Unable to assess ROS because ________    OBJECTIVE:  Vital Signs Last 24 Hrs  T(C): 36.6 (2025 12:18), Max: 36.8 (2025 23:40)  T(F): 97.9 (2025 12:18), Max: 98.2 (2025 23:40)  HR: 111 (2025 12:18) (84 - 113)  BP: 118/69 (2025 12:18) (110/75 - 121/81)  BP(mean): --  ABP: --  ABP(mean): --  RR: 17 (2025 12:18) (17 - 18)  SpO2: 94% (2025 12:18) (94% - 98%)    O2 Parameters below as of 2025 05:55  Patient On (Oxygen Delivery Method): room air               @ 07: @ 07:00  --------------------------------------------------------  IN: 540 mL / OUT: 700 mL / NET: -160 mL     @ 07: @ 14:27  --------------------------------------------------------  IN: 540 mL / OUT: 0 mL / NET: 540 mL      CAPILLARY BLOOD GLUCOSE          PHYSICAL EXAM:  General: Sitting upright in chair, NAD  HEENT:  NC/AT, PERRL, sclera clear, MMM  Neck: supple, No JVD  Respiratory:  CTA B/L  Cardiovascular: RRR, no m/r/g  Abdomen: soft, NTTP, ND, + BS  Extremities: no c/c/e  Skin: no rashes or lesions  Neurological: A&O, non focal  Psychiatry: appropriate affect     HOSPITAL MEDICATIONS:  enoxaparin Injectable 30 milliGRAM(s) SubCutaneous every 24 hours    cefTRIAXone   IVPB 1000 milliGRAM(s) IV Intermittent every 24 hours    amLODIPine   Tablet 10 milliGRAM(s) Oral daily    predniSONE   Tablet 40 milliGRAM(s) Oral daily  rosuvastatin 10 milliGRAM(s) Oral at bedtime    albuterol/ipratropium for Nebulization 3 milliLiter(s) Nebulizer every 6 hours  benzonatate 100 milliGRAM(s) Oral three times a day PRN  guaiFENesin Oral Liquid (Sugar-Free) 200 milliGRAM(s) Oral every 6 hours PRN  montelukast 10 milliGRAM(s) Oral daily    acetaminophen     Tablet .. 650 milliGRAM(s) Oral every 6 hours PRN  melatonin 3 milliGRAM(s) Oral at bedtime PRN    aluminum hydroxide/magnesium hydroxide/simethicone Suspension 30 milliLiter(s) Oral every 6 hours PRN  senna 2 Tablet(s) Oral at bedtime        calcium carbonate 1250 mG  + Vitamin D (OsCal 500 + D) 1 Tablet(s) Oral daily  cyanocobalamin 1000 MICROGram(s) Oral daily  ferrous    sulfate 325 milliGRAM(s) Oral <User Schedule>            LABS:    Hgb Trend: 11.7<--, 11.7<--, 12.8<--        Creatinine Trend: 0.36<--, 0.47<--, 0.60<--            MICROBIOLOGY:     FluA/FluB/RSV/COVID PCR (24 @ 11:27)    SARS-CoV-2 Result: NotDetec: For Emergency Use Authorization  This Respiratory Panel uses polymerase chain reaction (PCR) to detect for  influenza A; influenza B; and SARS-CoV-2.  This test was validated by [employer] and is in use under the FDA  Emergency Use Authorization (EUA) for clinical labs CLIA-certified to  perform high complexity testing. Test results should be correlated with  clinical presentation, patient history, and epidemiology.   Influenza A Result: NotDetec: For Emergency Use Authorization   Influenza B Result: NotDetec: For Emergency Use Authorization   Resp Syn Virus Result: NotDetec: For Emergency Use Authorization    Urinalysis with Rflx Culture (24 @ 14:45)    Urine Appearance: Clear   Color: Yellow   Specific Gravity: 1.015   pH Urine: 6.0   Protein, Urine: Trace mg/dL   Glucose Qualitative, Urine: 250 mg/dL   Ketone - Urine: 15 mg/dL   Blood, Urine: Negative   Bilirubin: Negative   Urobilinogen: 0.2 mg/dL   Leukocyte Esterase Concentration: Negative   Nitrite: Negative    Culture - Blood (24 @ 11:27)    Specimen Source: .Blood BLOOD   Culture Results:   No growth at 72 Hours    Culture - Blood (24 @ 11:27)    Specimen Source: .Blood BLOOD   Culture Results:   No growth at 72 Hours    Legionella pneumophila Antigen, Urine (24 @ 23:45)    Legionella Antigen, Urine: Negative:    Streptococcus pneumoniae Ag, Ur (24 @ 23:45)    Streptococcus pneumoniae Ag, Ur Result: Negative:     Mycoplasma Pneumoniae IgM Antibody (24 @ 10:10)    Mycoplasma IgM AB: 0.15 Index   Mycoplasma: Negative: Method ANJALI           Interpretation:                                             Index                  Negative              <=0.90                  Equivocal            0.91-1.09                  Positive                >=1.10          RADIOLOGY:    < from: Xray Chest 1 View- PORTABLE-Urgent (Xray Chest 1 View- PORTABLE-Urgent .) (24 @ 12:23) >  IMPRESSION: Left hilar infiltrate/atelectasis..    --- End of Report ---    < end of copied text >

## 2025-01-04 LAB
CULTURE RESULTS: SIGNIFICANT CHANGE UP
CULTURE RESULTS: SIGNIFICANT CHANGE UP
SPECIMEN SOURCE: SIGNIFICANT CHANGE UP
SPECIMEN SOURCE: SIGNIFICANT CHANGE UP
VIT B12 SERPL-MCNC: 1401 PG/ML — HIGH (ref 232–1245)

## 2025-01-04 PROCEDURE — 99232 SBSQ HOSP IP/OBS MODERATE 35: CPT

## 2025-01-04 RX ORDER — ACETYLCYSTEINE 200 MG/ML
4 VIAL (ML) MISCELLANEOUS
Refills: 0 | Status: DISCONTINUED | OUTPATIENT
Start: 2025-01-04 | End: 2025-01-06

## 2025-01-04 RX ADMIN — Medication 10 MILLIGRAM(S): at 05:34

## 2025-01-04 RX ADMIN — CEFTRIAXONE SODIUM 100 MILLIGRAM(S): 1 INJECTION, POWDER, FOR SOLUTION INTRAMUSCULAR; INTRAVENOUS at 10:47

## 2025-01-04 RX ADMIN — IPRATROPIUM BROMIDE AND ALBUTEROL SULFATE 3 MILLILITER(S): .5; 2.5 SOLUTION RESPIRATORY (INHALATION) at 05:22

## 2025-01-04 RX ADMIN — IPRATROPIUM BROMIDE AND ALBUTEROL SULFATE 3 MILLILITER(S): .5; 2.5 SOLUTION RESPIRATORY (INHALATION) at 17:09

## 2025-01-04 RX ADMIN — ENOXAPARIN SODIUM 30 MILLIGRAM(S): 60 INJECTION INTRAVENOUS; SUBCUTANEOUS at 18:05

## 2025-01-04 RX ADMIN — ROSUVASTATIN 10 MILLIGRAM(S): 40 TABLET, FILM COATED ORAL at 21:37

## 2025-01-04 RX ADMIN — IPRATROPIUM BROMIDE AND ALBUTEROL SULFATE 3 MILLILITER(S): .5; 2.5 SOLUTION RESPIRATORY (INHALATION) at 11:08

## 2025-01-04 RX ADMIN — Medication 1 TABLET(S): at 13:29

## 2025-01-04 RX ADMIN — CYANOCOBALAMIN 1000 MICROGRAM(S): 1000 INJECTION, SOLUTION INTRAMUSCULAR; SUBCUTANEOUS at 12:53

## 2025-01-04 RX ADMIN — PANTOPRAZOLE 40 MILLIGRAM(S): 40 TABLET, DELAYED RELEASE ORAL at 09:59

## 2025-01-04 RX ADMIN — MONTELUKAST SODIUM 10 MILLIGRAM(S): 10 TABLET, FILM COATED ORAL at 21:37

## 2025-01-04 RX ADMIN — FLUTICASONE PROPIONATE AND SALMETEROL 1 DOSE(S): 50; 500 POWDER ORAL; RESPIRATORY (INHALATION) at 18:04

## 2025-01-04 RX ADMIN — Medication 40 MILLIGRAM(S): at 05:34

## 2025-01-04 NOTE — PROGRESS NOTE ADULT - ASSESSMENT
84 years old female with h/o HTN, HLD, asthma present to ED with complain of worsening SOB and wheezing.    < from: Xray Chest 1 View- PORTABLE-Urgent (Xray Chest 1 View- PORTABLE-Urgent .) (12.30.24 @ 12:23) >  Left hilar infiltrate/atelectasis..   < end of copied text >  < from: CT Chest No Cont (01.03.25 @ 15:06) >  Left upper lobe groundglass infiltrate, likely infectious or inflammatory, with underlying discoid atelectasis and bronchiectasis.  Focally occluded proximal left upper lobe bronchus likely with chronic secretions. Cannot exclude underlying mass.  < end of copied text >      # Acute Respiratory Failure with Hypoxia - Remains on O2 at 1L / min.  Titrate off O2 as tolerated.  # Pneumonia - CXR (I personally reviewed) w/ L hilar consolidation.  Continue Abx.  I d/w patient to have repeat CXR in 4-6 weeks to ensure resolution. RVP Strep, Mycoplasma, Legionella, BCx all negative.  Rise in WBC to 12 likely reflects steroid use, pt clinically improving.    # Atelectasis / Bronchiectasis - trial of incentive spirometry, Mucomyst.    # Asthma with Acute Exacerbation - Continues to have wheeze.  Continue steroids, nebulizers prn.  Singulair.  Advair.  Pulmonary following  # Essential HTN - Monitor BP.  Continue antihypertensives.  # Hyperlipidemia - diet modification.  Continue Statin.  # Inpatient DVT Prophylaxis - Lovenox subcut    Plan of care d/w daughter Asia ([place]) via pt's phone.

## 2025-01-04 NOTE — PROGRESS NOTE ADULT - SUBJECTIVE AND OBJECTIVE BOX
Patient: JESSENIA CANO [id number] 84y Female                            Hospitalist Attending Note    No complaints.  On O2 3L.  Was able to tolerate RA this am.   Mild wheezing, improving.    ____________________PHYSICAL EXAM:  GENERAL:  NAD Alert and Oriented x 3   HEENT: NCAT  CARDIOVASCULAR:  S1, S2  LUNGS: coarse BS b/l.  Mild wheezing.   ABDOMEN:  soft, (-) tenderness, (-) distension, (+) bowel sounds, (-) guarding, (-) rebound (-) rigidity  EXTREMITIES:  no cyanosis / clubbing / edema.   ____________________      VITALS:  Vital Signs Last 24 Hrs  T(C): 36.6 (04 Jan 2025 17:11), Max: 36.6 (04 Jan 2025 17:11)  T(F): 97.9 (04 Jan 2025 17:11), Max: 97.9 (04 Jan 2025 17:11)  HR: 111 (04 Jan 2025 17:11) (77 - 115)  BP: 148/91 (04 Jan 2025 17:11) (124/78 - 148/91)  BP(mean): --  RR: 18 (04 Jan 2025 17:11) (16 - 18)  SpO2: 98% (04 Jan 2025 17:11) (95% - 98%)    Parameters below as of 04 Jan 2025 17:11  Patient On (Oxygen Delivery Method): room air     Daily     Daily   CAPILLARY BLOOD GLUCOSE        I&O's Summary    03 Jan 2025 07:01  -  04 Jan 2025 07:00  --------------------------------------------------------  IN: 540 mL / OUT: 0 mL / NET: 540 mL    04 Jan 2025 07:01  -  04 Jan 2025 18:08  --------------------------------------------------------  IN: 620 mL / OUT: 0 mL / NET: 620 mL        LABS:                        MEDICATIONS:  acetaminophen     Tablet .. 650 milliGRAM(s) Oral every 6 hours PRN  albuterol/ipratropium for Nebulization 3 milliLiter(s) Nebulizer every 6 hours  aluminum hydroxide/magnesium hydroxide/simethicone Suspension 30 milliLiter(s) Oral every 6 hours PRN  amLODIPine   Tablet 10 milliGRAM(s) Oral daily  benzonatate 100 milliGRAM(s) Oral three times a day PRN  calcium carbonate 1250 mG  + Vitamin D (OsCal 500 + D) 1 Tablet(s) Oral daily  cyanocobalamin 1000 MICROGram(s) Oral daily  enoxaparin Injectable 30 milliGRAM(s) SubCutaneous every 24 hours  ferrous    sulfate 325 milliGRAM(s) Oral <User Schedule>  fluticasone propionate/ salmeterol 250-50 MICROgram(s) Diskus 1 Dose(s) Inhalation two times a day  guaiFENesin Oral Liquid (Sugar-Free) 200 milliGRAM(s) Oral every 6 hours PRN  melatonin 3 milliGRAM(s) Oral at bedtime PRN  montelukast 10 milliGRAM(s) Oral daily  pantoprazole    Tablet 40 milliGRAM(s) Oral before breakfast  rosuvastatin 10 milliGRAM(s) Oral at bedtime  senna 2 Tablet(s) Oral at bedtime

## 2025-01-05 LAB
ANION GAP SERPL CALC-SCNC: 3 MMOL/L — LOW (ref 5–17)
BUN SERPL-MCNC: 23 MG/DL — SIGNIFICANT CHANGE UP (ref 7–23)
CALCIUM SERPL-MCNC: 8.6 MG/DL — SIGNIFICANT CHANGE UP (ref 8.5–10.1)
CHLORIDE SERPL-SCNC: 103 MMOL/L — SIGNIFICANT CHANGE UP (ref 96–108)
CO2 SERPL-SCNC: 33 MMOL/L — HIGH (ref 22–31)
CREAT SERPL-MCNC: 0.3 MG/DL — LOW (ref 0.5–1.3)
EGFR: 106 ML/MIN/1.73M2 — SIGNIFICANT CHANGE UP
GLUCOSE SERPL-MCNC: 76 MG/DL — SIGNIFICANT CHANGE UP (ref 70–99)
HCT VFR BLD CALC: 36.4 % — SIGNIFICANT CHANGE UP (ref 34.5–45)
HGB BLD-MCNC: 10.9 G/DL — LOW (ref 11.5–15.5)
MCHC RBC-ENTMCNC: 24.7 PG — LOW (ref 27–34)
MCHC RBC-ENTMCNC: 29.9 G/DL — LOW (ref 32–36)
MCV RBC AUTO: 82.4 FL — SIGNIFICANT CHANGE UP (ref 80–100)
NRBC # BLD: 0 /100 WBCS — SIGNIFICANT CHANGE UP (ref 0–0)
PLATELET # BLD AUTO: 318 K/UL — SIGNIFICANT CHANGE UP (ref 150–400)
POTASSIUM SERPL-MCNC: 3.6 MMOL/L — SIGNIFICANT CHANGE UP (ref 3.5–5.3)
POTASSIUM SERPL-SCNC: 3.6 MMOL/L — SIGNIFICANT CHANGE UP (ref 3.5–5.3)
RBC # BLD: 4.42 M/UL — SIGNIFICANT CHANGE UP (ref 3.8–5.2)
RBC # FLD: 16.7 % — HIGH (ref 10.3–14.5)
SODIUM SERPL-SCNC: 139 MMOL/L — SIGNIFICANT CHANGE UP (ref 135–145)
WBC # BLD: 9.09 K/UL — SIGNIFICANT CHANGE UP (ref 3.8–10.5)
WBC # FLD AUTO: 9.09 K/UL — SIGNIFICANT CHANGE UP (ref 3.8–10.5)

## 2025-01-05 PROCEDURE — 99232 SBSQ HOSP IP/OBS MODERATE 35: CPT

## 2025-01-05 RX ADMIN — Medication 4 MILLILITER(S): at 18:02

## 2025-01-05 RX ADMIN — IPRATROPIUM BROMIDE AND ALBUTEROL SULFATE 3 MILLILITER(S): .5; 2.5 SOLUTION RESPIRATORY (INHALATION) at 11:43

## 2025-01-05 RX ADMIN — Medication 4 MILLILITER(S): at 11:43

## 2025-01-05 RX ADMIN — IPRATROPIUM BROMIDE AND ALBUTEROL SULFATE 3 MILLILITER(S): .5; 2.5 SOLUTION RESPIRATORY (INHALATION) at 00:34

## 2025-01-05 RX ADMIN — Medication 325 MILLIGRAM(S): at 10:14

## 2025-01-05 RX ADMIN — IPRATROPIUM BROMIDE AND ALBUTEROL SULFATE 3 MILLILITER(S): .5; 2.5 SOLUTION RESPIRATORY (INHALATION) at 05:20

## 2025-01-05 RX ADMIN — Medication 4 MILLILITER(S): at 23:41

## 2025-01-05 RX ADMIN — MONTELUKAST SODIUM 10 MILLIGRAM(S): 10 TABLET, FILM COATED ORAL at 21:25

## 2025-01-05 RX ADMIN — Medication 1 TABLET(S): at 12:43

## 2025-01-05 RX ADMIN — FLUTICASONE PROPIONATE AND SALMETEROL 1 DOSE(S): 50; 500 POWDER ORAL; RESPIRATORY (INHALATION) at 17:19

## 2025-01-05 RX ADMIN — Medication 4 MILLILITER(S): at 05:20

## 2025-01-05 RX ADMIN — IPRATROPIUM BROMIDE AND ALBUTEROL SULFATE 3 MILLILITER(S): .5; 2.5 SOLUTION RESPIRATORY (INHALATION) at 18:01

## 2025-01-05 RX ADMIN — FLUTICASONE PROPIONATE AND SALMETEROL 1 DOSE(S): 50; 500 POWDER ORAL; RESPIRATORY (INHALATION) at 05:16

## 2025-01-05 RX ADMIN — ROSUVASTATIN 10 MILLIGRAM(S): 40 TABLET, FILM COATED ORAL at 21:25

## 2025-01-05 RX ADMIN — Medication 10 MILLIGRAM(S): at 05:15

## 2025-01-05 RX ADMIN — IPRATROPIUM BROMIDE AND ALBUTEROL SULFATE 3 MILLILITER(S): .5; 2.5 SOLUTION RESPIRATORY (INHALATION) at 23:41

## 2025-01-05 RX ADMIN — PANTOPRAZOLE 40 MILLIGRAM(S): 40 TABLET, DELAYED RELEASE ORAL at 08:54

## 2025-01-05 RX ADMIN — Medication 4 MILLILITER(S): at 00:34

## 2025-01-05 RX ADMIN — CYANOCOBALAMIN 1000 MICROGRAM(S): 1000 INJECTION, SOLUTION INTRAMUSCULAR; SUBCUTANEOUS at 12:18

## 2025-01-05 RX ADMIN — ENOXAPARIN SODIUM 30 MILLIGRAM(S): 60 INJECTION INTRAVENOUS; SUBCUTANEOUS at 17:20

## 2025-01-05 NOTE — PROGRESS NOTE ADULT - ASSESSMENT
84 years old female with h/o HTN, HLD, asthma present to ED with complain of worsening SOB and wheezing.    < from: Xray Chest 1 View- PORTABLE-Urgent (Xray Chest 1 View- PORTABLE-Urgent .) (12.30.24 @ 12:23) >  Left hilar infiltrate/atelectasis..   < end of copied text >  < from: CT Chest No Cont (01.03.25 @ 15:06) >  Left upper lobe groundglass infiltrate, likely infectious or inflammatory, with underlying discoid atelectasis and bronchiectasis.  Focally occluded proximal left upper lobe bronchus likely with chronic secretions. Cannot exclude underlying mass.  < end of copied text >      # Acute Respiratory Failure with Hypoxia - Remains on O2 at 1L / min.  Titrate off O2 as tolerated.  # Pneumonia - CXR (I personally reviewed) w/ L hilar consolidation.  Continue Abx.  I d/w patient to have repeat CXR in 4-6 weeks to ensure resolution. RVP Strep, Mycoplasma, Legionella, BCx all negative.  Rise in WBC to 12 likely reflects steroid use, pt clinically improving.    # Atelectasis / Bronchiectasis - trial of incentive spirometry, Mucomyst.    # Asthma with Acute Exacerbation - Continues to have wheeze.  Continue steroids, nebulizers prn.  Singulair.  Advair.  Pulmonary following  # Essential HTN - Monitor BP.  Continue antihypertensives.  # Hyperlipidemia - diet modification.  Continue Statin.  # Inpatient DVT Prophylaxis - Lovenox subcut    Plan of care d/w daughter Asia ([place]) via pt's phone on 1/4.

## 2025-01-05 NOTE — PROGRESS NOTE ADULT - SUBJECTIVE AND OBJECTIVE BOX
Patient: JESSENIA CANO [id number] 84y Female                            Hospitalist Attending Note    No new complaints.  Some cough, no significant improvement.  Still with mild wheeze.    ____________________PHYSICAL EXAM:  GENERAL:  NAD Alert and Oriented x 3   HEENT: NCAT  CARDIOVASCULAR:  S1, S2  LUNGS: coarse BS b/l.  Mild wheezing.   ABDOMEN:  soft, (-) tenderness, (-) distension, (+) bowel sounds, (-) guarding, (-) rebound (-) rigidity  EXTREMITIES:  no cyanosis / clubbing / edema.   ____________________      VITALS:  Vital Signs Last 24 Hrs  T(C): 36.6 (05 Jan 2025 11:04), Max: 37.1 (05 Jan 2025 00:02)  T(F): 97.8 (05 Jan 2025 11:04), Max: 98.7 (05 Jan 2025 00:02)  HR: 92 (05 Jan 2025 11:43) (83 - 111)  BP: 130/79 (05 Jan 2025 11:04) (117/64 - 148/91)  BP(mean): --  RR: 18 (05 Jan 2025 11:04) (18 - 18)  SpO2: 97% (05 Jan 2025 11:43) (95% - 98%)    Parameters below as of 05 Jan 2025 11:43  Patient On (Oxygen Delivery Method): nasal cannula     Daily     Daily   CAPILLARY BLOOD GLUCOSE        I&O's Summary    04 Jan 2025 07:01  -  05 Jan 2025 07:00  --------------------------------------------------------  IN: 920 mL / OUT: 0 mL / NET: 920 mL        LABS:                        10.9   9.09  )-----------( 318      ( 05 Jan 2025 06:20 )             36.4     01-05    139  |  103  |  23  ----------------------------<  76  3.6   |  33[H]  |  0.30[L]    Ca    8.6      05 Jan 2025 06:20          Urinalysis Basic - ( 05 Jan 2025 06:20 )    Color: x / Appearance: x / SG: x / pH: x  Gluc: 76 mg/dL / Ketone: x  / Bili: x / Urobili: x   Blood: x / Protein: x / Nitrite: x   Leuk Esterase: x / RBC: x / WBC x   Sq Epi: x / Non Sq Epi: x / Bacteria: x              MEDICATIONS:  acetaminophen     Tablet .. 650 milliGRAM(s) Oral every 6 hours PRN  acetylcysteine 20%  Inhalation 4 milliLiter(s) Inhalation four times a day  albuterol/ipratropium for Nebulization 3 milliLiter(s) Nebulizer every 6 hours  aluminum hydroxide/magnesium hydroxide/simethicone Suspension 30 milliLiter(s) Oral every 6 hours PRN  amLODIPine   Tablet 10 milliGRAM(s) Oral daily  benzonatate 100 milliGRAM(s) Oral three times a day PRN  calcium carbonate 1250 mG  + Vitamin D (OsCal 500 + D) 1 Tablet(s) Oral daily  cyanocobalamin 1000 MICROGram(s) Oral daily  enoxaparin Injectable 30 milliGRAM(s) SubCutaneous every 24 hours  ferrous    sulfate 325 milliGRAM(s) Oral <User Schedule>  fluticasone propionate/ salmeterol 250-50 MICROgram(s) Diskus 1 Dose(s) Inhalation two times a day  guaiFENesin Oral Liquid (Sugar-Free) 200 milliGRAM(s) Oral every 6 hours PRN  melatonin 3 milliGRAM(s) Oral at bedtime PRN  montelukast 10 milliGRAM(s) Oral daily  pantoprazole    Tablet 40 milliGRAM(s) Oral before breakfast  rosuvastatin 10 milliGRAM(s) Oral at bedtime  senna 2 Tablet(s) Oral at bedtime

## 2025-01-06 PROCEDURE — 99232 SBSQ HOSP IP/OBS MODERATE 35: CPT

## 2025-01-06 RX ORDER — IPRATROPIUM BROMIDE AND ALBUTEROL SULFATE .5; 2.5 MG/3ML; MG/3ML
3 SOLUTION RESPIRATORY (INHALATION) EVERY 6 HOURS
Refills: 0 | Status: DISCONTINUED | OUTPATIENT
Start: 2025-01-06 | End: 2025-01-10

## 2025-01-06 RX ADMIN — PANTOPRAZOLE 40 MILLIGRAM(S): 40 TABLET, DELAYED RELEASE ORAL at 07:47

## 2025-01-06 RX ADMIN — Medication 3 MILLIGRAM(S): at 21:20

## 2025-01-06 RX ADMIN — MONTELUKAST SODIUM 10 MILLIGRAM(S): 10 TABLET, FILM COATED ORAL at 21:24

## 2025-01-06 RX ADMIN — CYANOCOBALAMIN 1000 MICROGRAM(S): 1000 INJECTION, SOLUTION INTRAMUSCULAR; SUBCUTANEOUS at 11:40

## 2025-01-06 RX ADMIN — Medication 10 MILLIGRAM(S): at 05:35

## 2025-01-06 RX ADMIN — ENOXAPARIN SODIUM 30 MILLIGRAM(S): 60 INJECTION INTRAVENOUS; SUBCUTANEOUS at 17:52

## 2025-01-06 RX ADMIN — FLUTICASONE PROPIONATE AND SALMETEROL 1 DOSE(S): 50; 500 POWDER ORAL; RESPIRATORY (INHALATION) at 17:51

## 2025-01-06 RX ADMIN — Medication 4 MILLILITER(S): at 05:26

## 2025-01-06 RX ADMIN — IPRATROPIUM BROMIDE AND ALBUTEROL SULFATE 3 MILLILITER(S): .5; 2.5 SOLUTION RESPIRATORY (INHALATION) at 05:25

## 2025-01-06 RX ADMIN — FLUTICASONE PROPIONATE AND SALMETEROL 1 DOSE(S): 50; 500 POWDER ORAL; RESPIRATORY (INHALATION) at 05:35

## 2025-01-06 RX ADMIN — ROSUVASTATIN 10 MILLIGRAM(S): 40 TABLET, FILM COATED ORAL at 21:20

## 2025-01-06 RX ADMIN — Medication 1 TABLET(S): at 11:39

## 2025-01-06 NOTE — PROGRESS NOTE ADULT - SUBJECTIVE AND OBJECTIVE BOX
Patient: JESSENIA CANO [id number] 84y Female                            Hospitalist Attending Note    Seen this am, still on O2.  Daughter Joelle present via phone at   Symptoms improving.  No wheezing.    ____________________PHYSICAL EXAM:  GENERAL:  NAD Alert and Oriented x 3   HEENT: NCAT  CARDIOVASCULAR:  S1, S2  LUNGS: coarse BS b/l.    ABDOMEN:  soft, (-) tenderness, (-) distension, (+) bowel sounds, (-) guarding, (-) rebound (-) rigidity  EXTREMITIES:  no cyanosis / clubbing / edema.   ____________________      VITALS:  Vital Signs Last 24 Hrs  T(C): 36.9 (06 Jan 2025 17:25), Max: 36.9 (06 Jan 2025 17:25)  T(F): 98.5 (06 Jan 2025 17:25), Max: 98.5 (06 Jan 2025 17:25)  HR: 110 (06 Jan 2025 17:25) (65 - 110)  BP: 124/77 (06 Jan 2025 17:25) (123/75 - 129/79)  BP(mean): --  RR: 17 (06 Jan 2025 17:25) (17 - 19)  SpO2: 96% (06 Jan 2025 17:25) (96% - 97%)    Parameters below as of 06 Jan 2025 11:09  Patient On (Oxygen Delivery Method): room air     Daily     Daily   CAPILLARY BLOOD GLUCOSE        I&O's Summary    06 Jan 2025 07:01  -  06 Jan 2025 18:08  --------------------------------------------------------  IN: 360 mL / OUT: 0 mL / NET: 360 mL        LABS:                        10.9   9.09  )-----------( 318      ( 05 Jan 2025 06:20 )             36.4     01-05    139  |  103  |  23  ----------------------------<  76  3.6   |  33[H]  |  0.30[L]    Ca    8.6      05 Jan 2025 06:20          Urinalysis Basic - ( 05 Jan 2025 06:20 )    Color: x / Appearance: x / SG: x / pH: x  Gluc: 76 mg/dL / Ketone: x  / Bili: x / Urobili: x   Blood: x / Protein: x / Nitrite: x   Leuk Esterase: x / RBC: x / WBC x   Sq Epi: x / Non Sq Epi: x / Bacteria: x              MEDICATIONS:  acetaminophen     Tablet .. 650 milliGRAM(s) Oral every 6 hours PRN  albuterol/ipratropium for Nebulization 3 milliLiter(s) Nebulizer every 6 hours PRN  aluminum hydroxide/magnesium hydroxide/simethicone Suspension 30 milliLiter(s) Oral every 6 hours PRN  amLODIPine   Tablet 10 milliGRAM(s) Oral daily  benzonatate 100 milliGRAM(s) Oral three times a day PRN  calcium carbonate 1250 mG  + Vitamin D (OsCal 500 + D) 1 Tablet(s) Oral daily  cyanocobalamin 1000 MICROGram(s) Oral daily  enoxaparin Injectable 30 milliGRAM(s) SubCutaneous every 24 hours  ferrous    sulfate 325 milliGRAM(s) Oral <User Schedule>  fluticasone propionate/ salmeterol 250-50 MICROgram(s) Diskus 1 Dose(s) Inhalation two times a day  guaiFENesin Oral Liquid (Sugar-Free) 200 milliGRAM(s) Oral every 6 hours PRN  melatonin 3 milliGRAM(s) Oral at bedtime PRN  montelukast 10 milliGRAM(s) Oral daily  pantoprazole    Tablet 40 milliGRAM(s) Oral before breakfast  rosuvastatin 10 milliGRAM(s) Oral at bedtime  senna 2 Tablet(s) Oral at bedtime

## 2025-01-06 NOTE — PROGRESS NOTE ADULT - NS ATTEND AMEND GEN_ALL_CORE FT
84-year-old female with hypertension, hyperlipidemia, asthma presenting with asthma exacerbation in the setting of pneumonia.  Pulmonary consulted for pneumonia and asthma management.    Plan  – Patient completed a 5-day course of antibiotics for community-acquired pneumonia  – CT chest showed residual opacity within the left lingula  – Patient can continue with Advair while in the hospital and on discharge  – Patient can be discharged with albuterol inhaler to be used as needed for shortness of breath and wheezing  – Patient completed course of steroids as well for asthma exacerbation.  No need for additional steroids at this time  – Patient currently on room air with a saturation of greater than 90%  – Patient require pulmonary follow-up on discharge  – Out of bed to chair  – PT evaluation  – Patient can be discharged from a pulmonary standpoint  – Rest of care per primary team    Pulmonary to sign off, please call back with any questions

## 2025-01-06 NOTE — PROGRESS NOTE ADULT - ASSESSMENT
84 years old female with h/o HTN, HLD, asthma present to ED with complain of worsening SOB and wheezing.    < from: Xray Chest 1 View- PORTABLE-Urgent (Xray Chest 1 View- PORTABLE-Urgent .) (12.30.24 @ 12:23) >  Left hilar infiltrate/atelectasis..   < end of copied text >  < from: CT Chest No Cont (01.03.25 @ 15:06) >  Left upper lobe groundglass infiltrate, likely infectious or inflammatory, with underlying discoid atelectasis and bronchiectasis.  Focally occluded proximal left upper lobe bronchus likely with chronic secretions. Cannot exclude underlying mass.  < end of copied text >      # Acute Respiratory Failure with Hypoxia - Now off O2.  Titrate off O2 as tolerated.  # Pneumonia - CXR (I personally reviewed) w/ L hilar consolidation.  Complete Abx.  I d/w patient to have repeat CXR in 4-6 weeks to ensure resolution. RVP Strep, Mycoplasma, Legionella, BCx all negative.    # Atelectasis / Bronchiectasis - trial of incentive spirometry, Mucomyst.  Pulmonary f/u appreciated.    # Asthma with Acute Exacerbation - Continues to have wheeze.  Continue steroids, nebulizers prn.  Singulair.  Advair.  Pulmonary following  # Essential HTN - Monitor BP.  Continue antihypertensives.  # Hyperlipidemia - diet modification.  Continue Statin.  # Inpatient DVT Prophylaxis - Lovenox subcut    Plan of care d/w delisa Nova  1/6  Plan d/c C in am.

## 2025-01-06 NOTE — PROGRESS NOTE ADULT - SUBJECTIVE AND OBJECTIVE BOX
INTERVAL HPI/OVERNIGHT EVENTS: no acute events overnight     SUBJECTIVE: Patient seen and examined at bedside.     ROS: All negative except as listed above.    VITAL SIGNS:  ICU Vital Signs Last 24 Hrs  T(C): 36.8 (06 Jan 2025 05:22), Max: 36.9 (05 Jan 2025 17:35)  T(F): 98.2 (06 Jan 2025 05:22), Max: 98.4 (05 Jan 2025 17:35)  HR: 95 (06 Jan 2025 06:17) (92 - 99)  BP: 124/72 (06 Jan 2025 05:22) (122/78 - 130/79)  BP(mean): --  ABP: --  ABP(mean): --  RR: 18 (06 Jan 2025 05:22) (18 - 19)  SpO2: 96% (06 Jan 2025 06:17) (95% - 97%)    O2 Parameters below as of 06 Jan 2025 06:17  Patient On (Oxygen Delivery Method): nasal cannula      CAPILLARY BLOOD GLUCOSE          ECG: reviewed.    PHYSICAL EXAM:    General: Sitting upright in chair, NAD  HEENT:  NC/AT, PERRL, sclera clear, MMM  Neck: supple, No JVD  Respiratory:  CTA B/L  Cardiovascular: RRR, no m/r/g  Abdomen: soft, NTTP, ND, + BS  Extremities: no c/c/e  Skin: no rashes or lesions  Neurological: A&O, non focal    MEDICATIONS:  MEDICATIONS  (STANDING):  acetylcysteine 20%  Inhalation 4 milliLiter(s) Inhalation four times a day  albuterol/ipratropium for Nebulization 3 milliLiter(s) Nebulizer every 6 hours  amLODIPine   Tablet 10 milliGRAM(s) Oral daily  calcium carbonate 1250 mG  + Vitamin D (OsCal 500 + D) 1 Tablet(s) Oral daily  cyanocobalamin 1000 MICROGram(s) Oral daily  enoxaparin Injectable 30 milliGRAM(s) SubCutaneous every 24 hours  ferrous    sulfate 325 milliGRAM(s) Oral <User Schedule>  fluticasone propionate/ salmeterol 250-50 MICROgram(s) Diskus 1 Dose(s) Inhalation two times a day  montelukast 10 milliGRAM(s) Oral daily  pantoprazole    Tablet 40 milliGRAM(s) Oral before breakfast  rosuvastatin 10 milliGRAM(s) Oral at bedtime  senna 2 Tablet(s) Oral at bedtime    MEDICATIONS  (PRN):  acetaminophen     Tablet .. 650 milliGRAM(s) Oral every 6 hours PRN Mild Pain (1 - 3), Moderate Pain (4 - 6)  aluminum hydroxide/magnesium hydroxide/simethicone Suspension 30 milliLiter(s) Oral every 6 hours PRN Dyspepsia  benzonatate 100 milliGRAM(s) Oral three times a day PRN Cough  guaiFENesin Oral Liquid (Sugar-Free) 200 milliGRAM(s) Oral every 6 hours PRN Cough  melatonin 3 milliGRAM(s) Oral at bedtime PRN Insomnia      ALLERGIES:  Allergies    No Known Allergies    Intolerances        LABS:                        10.9   9.09  )-----------( 318      ( 05 Jan 2025 06:20 )             36.4     01-05    139  |  103  |  23  ----------------------------<  76  3.6   |  33[H]  |  0.30[L]    Ca    8.6      05 Jan 2025 06:20        Urinalysis Basic - ( 05 Jan 2025 06:20 )    Color: x / Appearance: x / SG: x / pH: x  Gluc: 76 mg/dL / Ketone: x  / Bili: x / Urobili: x   Blood: x / Protein: x / Nitrite: x   Leuk Esterase: x / RBC: x / WBC x   Sq Epi: x / Non Sq Epi: x / Bacteria: x      ABG:      vBG:    Micro:    Culture - Blood (collected 12-30-24 @ 11:27)  Source: .Blood BLOOD  Final Report (01-04-25 @ 15:00):    No growth at 5 days    Culture - Blood (collected 12-30-24 @ 11:27)  Source: .Blood BLOOD  Final Report (01-04-25 @ 15:00):    No growth at 5 days       Urinalysis with Rflx Culture (collected 12-30-24 @ 14:45)         RADIOLOGY & ADDITIONAL TESTS: Reviewed.   INTERVAL HPI/OVERNIGHT EVENTS: pt reportedly hypoxemic overnight requiring 3LNC. This AM pt satting 98% on RA breathing comfortably     SUBJECTIVE: Patient seen and examined at bedside. PT denies any SOB cough or CP    ROS: All negative except as listed above.    VITAL SIGNS:  ICU Vital Signs Last 24 Hrs  T(C): 36.8 (06 Jan 2025 05:22), Max: 36.9 (05 Jan 2025 17:35)  T(F): 98.2 (06 Jan 2025 05:22), Max: 98.4 (05 Jan 2025 17:35)  HR: 95 (06 Jan 2025 06:17) (92 - 99)  BP: 124/72 (06 Jan 2025 05:22) (122/78 - 130/79)  BP(mean): --  ABP: --  ABP(mean): --  RR: 18 (06 Jan 2025 05:22) (18 - 19)  SpO2: 96% (06 Jan 2025 06:17) (95% - 97%)    O2 Parameters below as of 06 Jan 2025 06:17  Patient On (Oxygen Delivery Method): nasal cannula      CAPILLARY BLOOD GLUCOSE          ECG: reviewed.    PHYSICAL EXAM:    General: Sitting upright in chair, NAD  HEENT:  NC/AT, PERRL, sclera clear, MMM  Neck: supple, No JVD  Respiratory:  CTA B/L  Cardiovascular: RRR, no m/r/g  Abdomen: soft, NTTP, ND, + BS  Extremities: no LE edema  Neurological: A&O, non focal    MEDICATIONS:  MEDICATIONS  (STANDING):  acetylcysteine 20%  Inhalation 4 milliLiter(s) Inhalation four times a day  albuterol/ipratropium for Nebulization 3 milliLiter(s) Nebulizer every 6 hours  amLODIPine   Tablet 10 milliGRAM(s) Oral daily  calcium carbonate 1250 mG  + Vitamin D (OsCal 500 + D) 1 Tablet(s) Oral daily  cyanocobalamin 1000 MICROGram(s) Oral daily  enoxaparin Injectable 30 milliGRAM(s) SubCutaneous every 24 hours  ferrous    sulfate 325 milliGRAM(s) Oral <User Schedule>  fluticasone propionate/ salmeterol 250-50 MICROgram(s) Diskus 1 Dose(s) Inhalation two times a day  montelukast 10 milliGRAM(s) Oral daily  pantoprazole    Tablet 40 milliGRAM(s) Oral before breakfast  rosuvastatin 10 milliGRAM(s) Oral at bedtime  senna 2 Tablet(s) Oral at bedtime    MEDICATIONS  (PRN):  acetaminophen     Tablet .. 650 milliGRAM(s) Oral every 6 hours PRN Mild Pain (1 - 3), Moderate Pain (4 - 6)  aluminum hydroxide/magnesium hydroxide/simethicone Suspension 30 milliLiter(s) Oral every 6 hours PRN Dyspepsia  benzonatate 100 milliGRAM(s) Oral three times a day PRN Cough  guaiFENesin Oral Liquid (Sugar-Free) 200 milliGRAM(s) Oral every 6 hours PRN Cough  melatonin 3 milliGRAM(s) Oral at bedtime PRN Insomnia      ALLERGIES:  Allergies    No Known Allergies    Intolerances        LABS:                        10.9   9.09  )-----------( 318      ( 05 Jan 2025 06:20 )             36.4     01-05    139  |  103  |  23  ----------------------------<  76  3.6   |  33[H]  |  0.30[L]    Ca    8.6      05 Jan 2025 06:20        Urinalysis Basic - ( 05 Jan 2025 06:20 )    Color: x / Appearance: x / SG: x / pH: x  Gluc: 76 mg/dL / Ketone: x  / Bili: x / Urobili: x   Blood: x / Protein: x / Nitrite: x   Leuk Esterase: x / RBC: x / WBC x   Sq Epi: x / Non Sq Epi: x / Bacteria: x      ABG:      vBG:    Micro:    Culture - Blood (collected 12-30-24 @ 11:27)  Source: .Blood BLOOD  Final Report (01-04-25 @ 15:00):    No growth at 5 days    Culture - Blood (collected 12-30-24 @ 11:27)  Source: .Blood BLOOD  Final Report (01-04-25 @ 15:00):    No growth at 5 days       Urinalysis with Rflx Culture (collected 12-30-24 @ 14:45)         RADIOLOGY & ADDITIONAL TESTS: Reviewed.

## 2025-01-06 NOTE — PROGRESS NOTE ADULT - TIME BILLING
Lab test review, Radiology Review, Vitals review, Consultant review and discussion, Physical examination, IDR, Assessment and plan; Plan discussion with patient and family
Review of patient records, including history, laboratory data, imaging. Patient evaluation and assessment. Coordination of care. Time excludes teaching.

## 2025-01-06 NOTE — PROGRESS NOTE ADULT - ASSESSMENT
84 years old female with h/o HTN, HLD, asthma present to ED with complain of worsening SOB and wheezing. Patient reported dry cough, SOB and wheezing which started around 5 days ago, progressively worsenied over last 2-3 days. Patient was seen in PCP office, noted low oxygen sat and was sent to ED. No fever or chills. Hypoxic to 88% at RA. WBC 10.62, plt 326, Cr 0.6, lactate 1.4, proBNP 68. Flu/COVID/RSV negative. Admitted to medicine with left PNA, Asthma exacerbation. Pulmonary consulted.     DX: Acute hypoxemic respiratory failure, PNA, Asthma Exacerbation, Bronchiectasis.     Reccs:    - Acute hypoxemic respiratory failure likely from PNA and asmtha exacerbation   - Supplemental O2 PRN as needed for goal O2 > 92%  - CT chest with ALY pna  - Continue with CAP coverage  - Mycoplasma, Legionella, Strep, Flu/covid/RSV, negative  - Blood cultures NTD  - Does have long standing history of asthma, no exacerbation in 19 years states.   - C/W duonebs q 6 for now, singulair  - cont advair  - s/p PO prednisone  - Rest of care per primary team     note incomplete 84 years old female with h/o HTN, HLD, asthma present to ED with complain of worsening SOB and wheezing. Patient reported dry cough, SOB and wheezing which started around 5 days ago, progressively worsenied over last 2-3 days. Patient was seen in PCP office, noted low oxygen sat and was sent to ED. No fever or chills. Hypoxic to 88% at RA. WBC 10.62, plt 326, Cr 0.6, lactate 1.4, proBNP 68. Flu/COVID/RSV negative. Admitted to medicine with left PNA, Asthma exacerbation. Pulmonary consulted.     DX: Acute hypoxemic respiratory failure, PNA, Asthma Exacerbation, Bronchiectasis.     Reccs:    - Acute hypoxemic respiratory failure likely from PNA and asmtha exacerbation   - weaned to RA satting well. check ambulatory sat  - CT chest with ALY pna  - s/p CAP covg  - Blood cultures NTD  - Does have long standing history of asthma, no exacerbation in 19 years states.   - cont advair and duonebs prn  - s/p PO prednisone  Please call [phone] or email  [email or web address] for an appointment at the Pulmonary office ([address], [address], [place], [place]).   - Rest of care per primary team     d/w dr mares 84 years old female with h/o HTN, HLD, asthma present to ED with complain of worsening SOB and wheezing. Patient reported dry cough, SOB and wheezing which started around 5 days ago, progressively worsenied over last 2-3 days. Patient was seen in PCP office, noted low oxygen sat and was sent to ED. No fever or chills. Hypoxic to 88% at RA. WBC 10.62, plt 326, Cr 0.6, lactate 1.4, proBNP 68. Flu/COVID/RSV negative. Admitted to medicine with left PNA, Asthma exacerbation. Pulmonary consulted.     DX: Acute hypoxemic respiratory failure, PNA, Asthma Exacerbation, Bronchiectasis.     Reccs:    - Acute hypoxemic respiratory failure likely from PNA and asmtha exacerbation   - weaned to RA satting well. check ambulatory sat  - CT chest with ALY pna  - s/p CAP covg  - Blood cultures NTD  - Does have long standing history of asthma, no exacerbation in 19 years states.   - cont advair and duonebs prn  - s/p PO prednisone  - CT chest with "Focally occluded proximal left upper lobe bronchus likely with chronic secretions. Cannot exclude underlying mass." would repeat CT chest in 4-6 weeks     Please call [phone] or email  [email or web address] for an appointment at the Pulmonary office ([address], [address], [place], [place]).   - Rest of care per primary team     d/w dr mares

## 2025-01-07 ENCOUNTER — TRANSCRIPTION ENCOUNTER (OUTPATIENT)
Age: 85
End: 2025-01-07

## 2025-01-07 LAB
ANION GAP SERPL CALC-SCNC: 2 MMOL/L — LOW (ref 5–17)
BUN SERPL-MCNC: 20 MG/DL — SIGNIFICANT CHANGE UP (ref 7–23)
CALCIUM SERPL-MCNC: 9.2 MG/DL — SIGNIFICANT CHANGE UP (ref 8.5–10.1)
CHLORIDE SERPL-SCNC: 101 MMOL/L — SIGNIFICANT CHANGE UP (ref 96–108)
CO2 SERPL-SCNC: 34 MMOL/L — HIGH (ref 22–31)
CREAT SERPL-MCNC: 0.33 MG/DL — LOW (ref 0.5–1.3)
EGFR: 103 ML/MIN/1.73M2 — SIGNIFICANT CHANGE UP
GLUCOSE SERPL-MCNC: 86 MG/DL — SIGNIFICANT CHANGE UP (ref 70–99)
HCT VFR BLD CALC: 41.8 % — SIGNIFICANT CHANGE UP (ref 34.5–45)
HGB BLD-MCNC: 12.3 G/DL — SIGNIFICANT CHANGE UP (ref 11.5–15.5)
MCHC RBC-ENTMCNC: 24.6 PG — LOW (ref 27–34)
MCHC RBC-ENTMCNC: 29.4 G/DL — LOW (ref 32–36)
MCV RBC AUTO: 83.6 FL — SIGNIFICANT CHANGE UP (ref 80–100)
NRBC # BLD: 0 /100 WBCS — SIGNIFICANT CHANGE UP (ref 0–0)
PLATELET # BLD AUTO: 284 K/UL — SIGNIFICANT CHANGE UP (ref 150–400)
POTASSIUM SERPL-MCNC: 4.1 MMOL/L — SIGNIFICANT CHANGE UP (ref 3.5–5.3)
POTASSIUM SERPL-SCNC: 4.1 MMOL/L — SIGNIFICANT CHANGE UP (ref 3.5–5.3)
RBC # BLD: 5 M/UL — SIGNIFICANT CHANGE UP (ref 3.8–5.2)
RBC # FLD: 16.9 % — HIGH (ref 10.3–14.5)
SODIUM SERPL-SCNC: 137 MMOL/L — SIGNIFICANT CHANGE UP (ref 135–145)
WBC # BLD: 9.28 K/UL — SIGNIFICANT CHANGE UP (ref 3.8–10.5)
WBC # FLD AUTO: 9.28 K/UL — SIGNIFICANT CHANGE UP (ref 3.8–10.5)

## 2025-01-07 PROCEDURE — 99232 SBSQ HOSP IP/OBS MODERATE 35: CPT

## 2025-01-07 RX ADMIN — PANTOPRAZOLE 40 MILLIGRAM(S): 40 TABLET, DELAYED RELEASE ORAL at 06:05

## 2025-01-07 RX ADMIN — Medication 1 TABLET(S): at 11:26

## 2025-01-07 RX ADMIN — Medication 3 MILLIGRAM(S): at 21:16

## 2025-01-07 RX ADMIN — Medication 325 MILLIGRAM(S): at 11:26

## 2025-01-07 RX ADMIN — ENOXAPARIN SODIUM 30 MILLIGRAM(S): 60 INJECTION INTRAVENOUS; SUBCUTANEOUS at 17:59

## 2025-01-07 RX ADMIN — MONTELUKAST SODIUM 10 MILLIGRAM(S): 10 TABLET, FILM COATED ORAL at 21:16

## 2025-01-07 RX ADMIN — FLUTICASONE PROPIONATE AND SALMETEROL 1 DOSE(S): 50; 500 POWDER ORAL; RESPIRATORY (INHALATION) at 06:05

## 2025-01-07 RX ADMIN — Medication 10 MILLIGRAM(S): at 06:05

## 2025-01-07 RX ADMIN — FLUTICASONE PROPIONATE AND SALMETEROL 1 DOSE(S): 50; 500 POWDER ORAL; RESPIRATORY (INHALATION) at 17:59

## 2025-01-07 RX ADMIN — ROSUVASTATIN 10 MILLIGRAM(S): 40 TABLET, FILM COATED ORAL at 21:16

## 2025-01-07 RX ADMIN — CYANOCOBALAMIN 1000 MICROGRAM(S): 1000 INJECTION, SOLUTION INTRAMUSCULAR; SUBCUTANEOUS at 11:26

## 2025-01-07 NOTE — DISCHARGE NOTE PROVIDER - NS AS DC PROVIDER CONTACT Y/N MULTI
Transitional Care Management Visit       Admission Date: 09/08/2024  Discharge Date: 09/09/2024   This Visit: 09/12/2024 is 3 days after discharge.    Ángel Sorto is a 32 year old here for Hospital F/U (Admitted on 09/08/24 due vaginal Bleeding ) and Other (Dizzy and no energy )     The discharge summary was reviewed. It documents that the patient was hospitalized for menorrhagia, heavy vaginal bleeding. She was noticed to have tachycardia along with passing medium size clot in emergency department. Patient was transfused 1 unit PRBCs in the emergency department. Hemoglobin was 7.8 prior to transfusion and increased to 7.9 after transfusion. Tachycardia has resolved. Patient saw Dr. Albright yesterday.     Medication changes include: added megace  Pertinent hospital labs and tests: were reviewed.  Durable Medical Equipment/Assistive devices ordered: None     Review of Systems   Constitutional:  Positive for fatigue.   HENT: Negative.     Eyes: Negative.    Respiratory:  Negative for cough and shortness of breath.    Cardiovascular:  Negative for chest pain, palpitations and leg swelling.   Gastrointestinal:  Negative for constipation.   Genitourinary:  Positive for menstrual problem and vaginal bleeding.   Musculoskeletal:  Negative for arthralgias and back pain.   Neurological: Negative.    Psychiatric/Behavioral: Negative.       Advance care planning documents on file - no    Objective {Show More Vitals   :3}  Vitals:    09/12/24 1127   BP: 100/76   Pulse: (!) 106   Resp: 16   Temp: 96.4 °F (35.8 °C)   TempSrc: Temporal   SpO2: 100%   Weight: 96.2 kg (212 lb)   Height: 5' 3\" (1.6 m)     Physical Exam  Vitals and nursing note reviewed.   Constitutional:       Appearance: Normal appearance. She is obese.   HENT:      Mouth/Throat:      Mouth: Mucous membranes are moist.   Neck:      Thyroid: No thyromegaly.   Cardiovascular:      Rate and Rhythm: Normal rate and regular rhythm.      Heart sounds: Normal  heart sounds.   Pulmonary:      Effort: Pulmonary effort is normal.      Breath sounds: Normal breath sounds. No wheezing or rales.   Abdominal:      General: Bowel sounds are normal.      Palpations: Abdomen is soft. There is no mass.      Tenderness: There is no abdominal tenderness.   Musculoskeletal:      Right lower leg: No edema.      Left lower leg: No edema.   Skin:     Coloration: Skin is pale.   Neurological:      Mental Status: She is alert and oriented to person, place, and time.   Psychiatric:         Mood and Affect: Mood normal.          ASSESSMENT AND PLAN        1. Iron deficiency anemia due to chronic blood loss  2. Fibroids    Discharge medication were reviewed and updated with patient/family: fully compliant with the medication regimen prescribed at the time of discharge     Adherence to discharge treatment plan was assessed: fully adherent with the entire discharge treatment plan.    Patient to follow up with Dr. Bermudez and Dr. Albright.     Follow Up 3 mon.     Future Appointments          MAY 5    2025   03:20 PM - Physical exam  [facility] [address] Mackenzie MD                  Yes

## 2025-01-07 NOTE — DISCHARGE NOTE PROVIDER - HOSPITAL COURSE
84 years old female with h/o HTN, HLD, asthma present to ED with complain of worsening SOB and wheezing.    < from: Xray Chest 1 View- PORTABLE-Urgent (Xray Chest 1 View- PORTABLE-Urgent .) (12.30.24 @ 12:23) >  Left hilar infiltrate/atelectasis..   < end of copied text >  < from: CT Chest No Cont (01.03.25 @ 15:06) >  Left upper lobe groundglass infiltrate, likely infectious or inflammatory, with underlying discoid atelectasis and bronchiectasis.  Focally occluded proximal left upper lobe bronchus likely with chronic secretions. Cannot exclude underlying mass.  < end of copied text >      # Acute Respiratory Failure with Hypoxia - Now off O2.  Titrate off O2 as tolerated.  # Pneumonia - CXR (I personally reviewed) w/ L hilar consolidation.  Complete Abx.  I d/w patient to have repeat CXR in 4-6 weeks to ensure resolution. RVP Strep, Mycoplasma, Legionella, BCx all negative.    # Atelectasis / Bronchiectasis - trial of incentive spirometry, Mucomyst.  Pulmonary f/u appreciated.    # Asthma with Acute Exacerbation - Continues to have wheeze.  Continue steroids, nebulizers prn.  Singulair.  Advair.  Pulmonary following  # Essential HTN - Monitor BP.  Continue antihypertensives.  # Hyperlipidemia - diet modification.  Continue Statin.  # Inpatient DVT Prophylaxis - Lovenox subcut    Plan of care d/w daughter Rohini  1/6  Stable for d/c home with homecare, Nocturnal home O2.  I d/w pulmonary regarding sleep study as outpatient to further assess nocturnal hypoxia.     Disposition: Stable for discharge.  Outpatient followup discussed.  Total time spent on discharge is  35  minutes. 84 years old female with h/o HTN, HLD, asthma present to ED with complain of worsening SOB and wheezing.    < from: Xray Chest 1 View- PORTABLE-Urgent (Xray Chest 1 View- PORTABLE-Urgent .) (12.30.24 @ 12:23) >  Left hilar infiltrate/atelectasis..   < end of copied text >  < from: CT Chest No Cont (01.03.25 @ 15:06) >  Left upper lobe groundglass infiltrate, likely infectious or inflammatory, with underlying discoid atelectasis and bronchiectasis.  Focally occluded proximal left upper lobe bronchus likely with chronic secretions. Cannot exclude underlying mass.  < end of copied text >      # Acute Respiratory Failure with Hypoxia - Now off O2.  Titrate off O2 as tolerated.  # Pneumonia - CXR (I personally reviewed) w/ L hilar consolidation.  Complete Abx.  I d/w patient to have repeat CXR in 4-6 weeks to ensure resolution. RVP Strep, Mycoplasma, Legionella, BCx all negative.    # Atelectasis / Bronchiectasis - trial of incentive spirometry, Mucomyst.  Pulmonary f/u appreciated.    # Asthma with Acute Exacerbation - Continues to have wheeze.  Continue steroids, nebulizers prn.  Singulair.  Advair.  Pulmonary following  # Essential HTN - Monitor BP.  Continue antihypertensives.  # Hyperlipidemia - diet modification.  Continue Statin.  # Inpatient DVT Prophylaxis - Lovenox subcut    Plan of care d/w daughter 1/9  Stable for d/c home with homecare, Nocturnal home O2.  I d/w pulmonary regarding sleep study as outpatient to further assess nocturnal hypoxia.     Disposition: Stable for discharge.  Outpatient followup discussed.  Total time spent on discharge is  35  minutes.     84 years old female with h/o HTN, HLD, asthma present to ED with complain of worsening SOB and wheezing.    < from: Xray Chest 1 View- PORTABLE-Urgent (Xray Chest 1 View- PORTABLE-Urgent .) (12.30.24 @ 12:23) >  Left hilar infiltrate/atelectasis..   < end of copied text >  < from: CT Chest No Cont (01.03.25 @ 15:06) >  Left upper lobe groundglass infiltrate, likely infectious or inflammatory, with underlying discoid atelectasis and bronchiectasis.  Focally occluded proximal left upper lobe bronchus likely with chronic secretions. Cannot exclude underlying mass.  < end of copied text >      # Acute Respiratory Failure with Hypoxia - Now off O2, but requiring nocturnal O2.  Discussed with pulmonary.   # Pneumonia - CXR (I personally reviewed) w/ L hilar consolidation.  Complete Abx.  I d/w patient to have repeat CXR in 4-6 weeks to ensure resolution. RVP Strep, Mycoplasma, Legionella, BCx all negative.    # Atelectasis / Bronchiectasis - trial of incentive spirometry, Mucomyst.  Pulmonary f/u appreciated.    # Asthma with Acute Exacerbation - Continues to have wheeze.  Continue steroids, nebulizers prn.  Singulair.  Advair.  Pulmonary following  # Essential HTN - Monitor BP.  Continue antihypertensives.  # Hyperlipidemia - diet modification.  Continue Statin.  # Moderate Protein Calorie Malnutrition - encourage po intake.  Supplements as ordered.   # Inpatient DVT Prophylaxis - Lovenox subcut    Plan of care d/w daughter Asia 1/9  Stable for d/c home with homecare, Nocturnal home O2.  I d/w pulmonary regarding sleep study as outpatient to further assess nocturnal hypoxia.     Disposition: Stable for discharge.  Outpatient followup discussed.  Total time spent on discharge is  35  minutes.

## 2025-01-07 NOTE — PROGRESS NOTE ADULT - ASSESSMENT
84 years old female with h/o HTN, HLD, asthma present to ED with complain of worsening SOB and wheezing.    < from: Xray Chest 1 View- PORTABLE-Urgent (Xray Chest 1 View- PORTABLE-Urgent .) (12.30.24 @ 12:23) >  Left hilar infiltrate/atelectasis..   < end of copied text >  < from: CT Chest No Cont (01.03.25 @ 15:06) >  Left upper lobe groundglass infiltrate, likely infectious or inflammatory, with underlying discoid atelectasis and bronchiectasis.  Focally occluded proximal left upper lobe bronchus likely with chronic secretions. Cannot exclude underlying mass.  < end of copied text >      # Acute Respiratory Failure with Hypoxia - Now off O2.  Titrate off O2 as tolerated.  # Pneumonia - CXR (I personally reviewed) w/ L hilar consolidation.  Complete Abx.  I d/w patient to have repeat CXR in 4-6 weeks to ensure resolution. RVP Strep, Mycoplasma, Legionella, BCx all negative.    # Atelectasis / Bronchiectasis - trial of incentive spirometry, Mucomyst.  Pulmonary f/u appreciated.    # Asthma with Acute Exacerbation - Continues to have wheeze.  Continue steroids, nebulizers prn.  Singulair.  Advair.  Pulmonary following  # Essential HTN - Monitor BP.  Continue antihypertensives.  # Hyperlipidemia - diet modification.  Continue Statin.  # Inpatient DVT Prophylaxis - Lovenox subcut    Plan of care d/w daughter Rohini  1/6  Stable for d/c home with homecare, Nocturnal home O2.  I d/w pulmonary regarding sleep study as outpatient to further assess nocturnal hypoxia.

## 2025-01-07 NOTE — PROGRESS NOTE ADULT - SUBJECTIVE AND OBJECTIVE BOX
Patient: JESSENIA CANO [id number] 84y Female                            Hospitalist Attending Note    Off O2.  No wheezing.  SaO2 88% overnight requiring O2.    ____________________PHYSICAL EXAM:  GENERAL:  NAD Alert and Oriented x 3   HEENT: NCAT  CARDIOVASCULAR:  S1, S2  LUNGS: coarse BS b/l.    ABDOMEN:  soft, (-) tenderness, (-) distension, (+) bowel sounds, (-) guarding, (-) rebound (-) rigidity  EXTREMITIES:  no cyanosis / clubbing / edema.   ____________________      VITALS:  Vital Signs Last 24 Hrs  T(C): 36.4 (07 Jan 2025 11:04), Max: 36.9 (06 Jan 2025 17:25)  T(F): 97.6 (07 Jan 2025 11:04), Max: 98.5 (06 Jan 2025 17:25)  HR: 85 (07 Jan 2025 11:04) (83 - 110)  BP: 118/72 (07 Jan 2025 11:04) (117/69 - 135/71)  BP(mean): --  RR: 18 (07 Jan 2025 11:04) (17 - 18)  SpO2: 94% (07 Jan 2025 11:04) (94% - 97%)    Parameters below as of 07 Jan 2025 11:04  Patient On (Oxygen Delivery Method): nasal cannula  O2 Flow (L/min): 2   Daily     Daily   CAPILLARY BLOOD GLUCOSE        I&O's Summary    06 Jan 2025 07:01  -  07 Jan 2025 07:00  --------------------------------------------------------  IN: 360 mL / OUT: 0 mL / NET: 360 mL    07 Jan 2025 07:01  -  07 Jan 2025 12:53  --------------------------------------------------------  IN: 360 mL / OUT: 0 mL / NET: 360 mL        LABS:                        12.3   9.28  )-----------( 284      ( 07 Jan 2025 07:50 )             41.8     01-07    137  |  101  |  20  ----------------------------<  86  4.1   |  34[H]  |  0.33[L]    Ca    9.2      07 Jan 2025 07:50          Urinalysis Basic - ( 07 Jan 2025 07:50 )    Color: x / Appearance: x / SG: x / pH: x  Gluc: 86 mg/dL / Ketone: x  / Bili: x / Urobili: x   Blood: x / Protein: x / Nitrite: x   Leuk Esterase: x / RBC: x / WBC x   Sq Epi: x / Non Sq Epi: x / Bacteria: x              MEDICATIONS:  acetaminophen     Tablet .. 650 milliGRAM(s) Oral every 6 hours PRN  albuterol/ipratropium for Nebulization 3 milliLiter(s) Nebulizer every 6 hours PRN  aluminum hydroxide/magnesium hydroxide/simethicone Suspension 30 milliLiter(s) Oral every 6 hours PRN  amLODIPine   Tablet 10 milliGRAM(s) Oral daily  benzonatate 100 milliGRAM(s) Oral three times a day PRN  calcium carbonate 1250 mG  + Vitamin D (OsCal 500 + D) 1 Tablet(s) Oral daily  cyanocobalamin 1000 MICROGram(s) Oral daily  enoxaparin Injectable 30 milliGRAM(s) SubCutaneous every 24 hours  fluticasone propionate/ salmeterol 250-50 MICROgram(s) Diskus 1 Dose(s) Inhalation two times a day  guaiFENesin Oral Liquid (Sugar-Free) 200 milliGRAM(s) Oral every 6 hours PRN  melatonin 3 milliGRAM(s) Oral at bedtime PRN  montelukast 10 milliGRAM(s) Oral daily  pantoprazole    Tablet 40 milliGRAM(s) Oral before breakfast  rosuvastatin 10 milliGRAM(s) Oral at bedtime  senna 2 Tablet(s) Oral at bedtime

## 2025-01-07 NOTE — DISCHARGE NOTE PROVIDER - NSDCCPCAREPLAN_GEN_ALL_CORE_FT
PRINCIPAL DISCHARGE DIAGNOSIS  Diagnosis: Acute respiratory failure with hypoxia  Assessment and Plan of Treatment:       SECONDARY DISCHARGE DIAGNOSES  Diagnosis: Hyperlipidemia, unspecified  Assessment and Plan of Treatment:     Diagnosis: Benign essential HTN  Assessment and Plan of Treatment:     Diagnosis: Acute asthma exacerbation  Assessment and Plan of Treatment:     Diagnosis: Acute asthma exacerbation  Assessment and Plan of Treatment:     Diagnosis: Pneumonia  Assessment and Plan of Treatment:      PRINCIPAL DISCHARGE DIAGNOSIS  Diagnosis: Acute respiratory failure with hypoxia  Assessment and Plan of Treatment:       SECONDARY DISCHARGE DIAGNOSES  Diagnosis: Hyperlipidemia, unspecified  Assessment and Plan of Treatment:     Diagnosis: Benign essential HTN  Assessment and Plan of Treatment:     Diagnosis: Acute asthma exacerbation  Assessment and Plan of Treatment:     Diagnosis: Acute asthma exacerbation  Assessment and Plan of Treatment:     Diagnosis: Pneumonia  Assessment and Plan of Treatment:     Diagnosis: Moderate protein-calorie malnutrition  Assessment and Plan of Treatment:

## 2025-01-07 NOTE — DISCHARGE NOTE PROVIDER - NSDCHHBASESERVICE_GEN_ALL_CORE
TEMP REMAINS 100.4 DESPITE MED WITH TYLENOL. BIS CONTINUES TO TREND IN THE
40'S. /94 DESPITE MED X 2 WITH LABETOLOL 20 MG IVP-SEE EMAR. Physical therapy

## 2025-01-07 NOTE — DISCHARGE NOTE PROVIDER - NSDCMRMEDTOKEN_GEN_ALL_CORE_FT
Advair Diskus 500 mcg-50 mcg inhalation powder: 1 puff(s) inhaled 2 times a day  Albuterol (Eqv-ProAir HFA) 90 mcg/inh inhalation aerosol: 2 puff(s) inhaled every 6 hours as needed for  shortness of breath and/or wheezing  clonidine 0.1 mg oral tablet: 1 tab(s) orally 2 times a day  felodipine 10 mg oral tablet, extended release: 1 tab(s) orally once a day  Fosamax 70 mg oral tablet: 1 tab(s) orally once a week  montelukast 10 mg oral tablet: 1 tab(s) orally once a day  Multiple Vitamins oral tablet: 1 tab(s) orally once a day  pantoprazole 40 mg oral delayed release tablet: 1 tab(s) orally once a day (before a meal)  polyethylene glycol 3350 oral powder for reconstitution: 17 gram(s) orally once a day (at bedtime)  rosuvastatin 10 mg oral capsule: 1 cap(s) orally once a day  senna leaf extract oral tablet: 2 tab(s) orally once a day (at bedtime)

## 2025-01-07 NOTE — DISCHARGE NOTE PROVIDER - PROVIDER TOKENS
FREE:[LAST:[Pulmonary],PHONE:[(   )    -],FAX:[(   )    -],ADDRESS:[[facility]  [address] [place] [address], [address]  [phone]   Email  [email or web address]]],FREE:[LAST:[Primary MD],PHONE:[(   )    -],FAX:[(   )    -]]

## 2025-01-07 NOTE — DISCHARGE NOTE PROVIDER - NSDCFUADDINST_GEN_ALL_CORE_FT
It is important to see your primary physician as well as any specialty physicians within the next week to perform a comprehensive medical review.  Call their offices for an appointment as soon as you leave the hospital.  You will also need to see them for renewal of your medications.  If have any difficulty following with a physician, contact the [facility] [phone] or via [email or web address].   To obtain your results, you can access the [email or web address] Patient Portal at [email or web address].  Your medical issues appear to be stable at this time, but if your symptoms recur or worsen, contact your physicians and/or return to the hospital if necessary.  If you encounter any issues or questions with your medication, call your physicians before stopping the medication.  Do not drive.  Limit your diet to 2 grams of sodium daily.

## 2025-01-07 NOTE — DISCHARGE NOTE PROVIDER - NSDCFUADDAPPT_GEN_ALL_CORE_FT
APPTS ARE READY TO BE MADE: [X] YES    Best Family or Patient Contact (if needed):    Additional Information about above appointments (if needed):    During your hospitalization, you had abnormal findings on radiologic / laboratory studies.  Please see your primary doctor for followup of these findings to ensure that they have resolved.  You may require further evaluation to exclude certain serious conditions, and / or treatment.  < from: CT Chest No Cont (01.03.25 @ 15:06) >    Left upper lobe groundglass infiltrate, likely infectious or   inflammatory, with underlying discoid atelectasis and bronchiectasis.    Focally occluded proximal left upper lobe bronchus likely with chronic   secretions. Cannot exclude underlying mass.    < end of copied text >     APPTS ARE READY TO BE MADE: [X] YES    Best Family or Patient Contact (if needed):    Additional Information about above appointments (if needed):    During your hospitalization, you had abnormal findings on radiologic / laboratory studies.  Please see your primary doctor for followup of these findings to ensure that they have resolved.  You may require further evaluation to exclude certain serious conditions, and / or treatment.  < from: CT Chest No Cont (01.03.25 @ 15:06) >    Left upper lobe groundglass infiltrate, likely infectious or   inflammatory, with underlying discoid atelectasis and bronchiectasis.    Focally occluded proximal left upper lobe bronchus likely with chronic   secretions. Cannot exclude underlying mass.    < end of copied text >    1/16-1/16- Spoke with  daughter Peri, who stated Pulmonary is on hold as now until PCP obtain results from some test was done last week.     1/16-Patient's daughter Devica  informed us they already have secured a follow up appointment which was not scheduled by our team on Sunday 1/14/25 with PCP

## 2025-01-07 NOTE — DISCHARGE NOTE PROVIDER - CARE PROVIDER_API CALL
Pulmonary,   [facility]  [address] [place] [address], [place] [address], [place], [place]  [phone]   Email  [email or web address]  Phone: (   )    -  Fax: (   )    -  Follow Up Time:     Primary MD,   Phone: (   )    -  Fax: (   )    -  Follow Up Time:

## 2025-01-07 NOTE — DISCHARGE NOTE PROVIDER - DETAILS OF MALNUTRITION DIAGNOSIS/DIAGNOSES
This patient has been assessed with a concern for Malnutrition and was treated during this hospitalization for the following Nutrition diagnosis/diagnoses:     -  01/08/2025: Moderate protein-calorie malnutrition

## 2025-01-07 NOTE — CHART NOTE - NSCHARTNOTEFT_GEN_A_CORE
Oxygen   Patient is being discharged home on oxygen. Patient is in a stable condition with Acute hypoxemic respiratory failure likely from PNA and asthma exacerbation. Pulse ox of 95% was achieved on room air at rest, however desatted to 88% overnight, requiring 2-3L nasal cannula.   Patient is mobile within the home and needs portability. Oxygen   Patient is being discharged home on oxygen. Patient is in a stable condition with Acute hypoxemic respiratory failure with suspected chronic component.  SaO2 fluctuates, is 95% on RA, however, drops to 88% on RA at rest during nighttime, with O2 2-3L improves to 94-96% at rest.

## 2025-01-08 PROCEDURE — 99232 SBSQ HOSP IP/OBS MODERATE 35: CPT

## 2025-01-08 RX ADMIN — CYANOCOBALAMIN 1000 MICROGRAM(S): 1000 INJECTION, SOLUTION INTRAMUSCULAR; SUBCUTANEOUS at 12:08

## 2025-01-08 RX ADMIN — Medication 10 MILLIGRAM(S): at 05:33

## 2025-01-08 RX ADMIN — ROSUVASTATIN 10 MILLIGRAM(S): 40 TABLET, FILM COATED ORAL at 21:57

## 2025-01-08 RX ADMIN — FLUTICASONE PROPIONATE AND SALMETEROL 1 DOSE(S): 50; 500 POWDER ORAL; RESPIRATORY (INHALATION) at 05:34

## 2025-01-08 RX ADMIN — ENOXAPARIN SODIUM 30 MILLIGRAM(S): 60 INJECTION INTRAVENOUS; SUBCUTANEOUS at 18:07

## 2025-01-08 RX ADMIN — MONTELUKAST SODIUM 10 MILLIGRAM(S): 10 TABLET, FILM COATED ORAL at 21:57

## 2025-01-08 RX ADMIN — Medication 1 TABLET(S): at 12:08

## 2025-01-08 RX ADMIN — PANTOPRAZOLE 40 MILLIGRAM(S): 40 TABLET, DELAYED RELEASE ORAL at 05:33

## 2025-01-08 RX ADMIN — FLUTICASONE PROPIONATE AND SALMETEROL 1 DOSE(S): 50; 500 POWDER ORAL; RESPIRATORY (INHALATION) at 18:07

## 2025-01-08 NOTE — DIETITIAN INITIAL EVALUATION ADULT - ORAL INTAKE PTA/DIET HISTORY
Pt reports normal appetite/PO intake PTA; states she is not a big eater and generally eats 1-2 meals per day. Does not follow any dietary restrictions at home.  Poor dentition, no dentures; states she sometimes has difficulty with very chewing foods; amenable to easy to chew consistency.

## 2025-01-08 NOTE — DIETITIAN NUTRITION RISK NOTIFICATION - TREATMENT: THE FOLLOWING DIET HAS BEEN RECOMMENDED
Diet, Easy to Chew:   Low Sodium  Supplement Feeding Modality:  Oral  Ensure Plus High Protein Cans or Servings Per Day:  1       Frequency:  Two Times a day (01-08-25 @ 11:03) [Pending Verification By Attending]  Diet, DASH/TLC:   Sodium & Cholesterol Restricted (12-30-24 @ 13:36) [Active]

## 2025-01-08 NOTE — DIETITIAN INITIAL EVALUATION ADULT - ENERGY INTAKE
PO intake % for documented meals as per flow sheets. Amenable to Ensure supplements to optimize intake. Denies any chew/swallowing difficulty.  Reports ht 4'11" and wt stable at ~100 lbs x years.

## 2025-01-08 NOTE — DIETITIAN INITIAL EVALUATION ADULT - PERTINENT LABORATORY DATA
01-07    137  |  101  |  20  ----------------------------<  86  4.1   |  34[H]  |  0.33[L]    Ca    9.2      07 Jan 2025 07:50

## 2025-01-08 NOTE — DIETITIAN INITIAL EVALUATION ADULT - PHYSICAL ASSESSMENT TEMPLES
Here for follow up diabetes with renal failure  , hypertension and hyperlipidemia and history of stroke in the past     She was on ozempic 0.25 mg and she had increasing abdominal discomfort and diarrhea as well so stopped it      walks daily an hour ( 2 to 3 miles a day )  Last A1c 6.8 in 1/24    No dizziness no low blood sugar   No diarrhea   FBS have been 130 this morning   Sleep is good   Eats once a day at 3 pm   No dizziness no light headedness   Some constipation ongoing     On metformin 500 mg once a day mid afternoon  Has cholecystectomy in 2/23 recovered well    has some  diarrhea  After surgery , takes questran once to twice a day  Eye exam pending   Past Medical History:   Diagnosis Date    Chronic kidney disease     stage 3    Colon polyp 09/11/2023    dr. ahumada, tubular adenoma recall 7 years    Coronary artery disease     triple bypass    CVA (cerebral vascular accident)  (CMD) 12/01/2010    Acute stroke of right occipital lobe which was found to be cryptogenic->residual vision loss, bilateral eyes    Diverticulosis of colon (without mention of hemorrhage)     High cholesterol     HTN (hypertension)     Hyperlipidemia     MRSA (methicillin resistant staph aureus) culture positive 12/03/2010    Temitope    Paroxysmal atrial fibrillation  (CMD)     History of    Type II diabetes mellitus  (CMD)     Checks blood sugar at home every morning    Wears prescription eyeglasses        Visit Vitals  /70   Pulse 97   Temp 98.6 °F (37 °C)   Ht 5' 4\" (1.626 m)   Wt 62.6 kg (138 lb 1.9 oz)   SpO2 99%   BMI 23.71 kg/m²       Wt Readings from Last 4 Encounters:   06/04/24 62.6 kg (138 lb 1.9 oz)   02/13/24 58.8 kg (129 lb 9.6 oz)   02/05/24 59.9 kg (132 lb)   01/02/24 63.3 kg (139 lb 8.8 oz)      Constitutional:  A+O x 3. In NAD.    Cardiovascular:  Normal rate. Normal rhythm. No murmurs, gallops, or rubs.    Respiratory:  No respiratory distress. Normal breath sounds. No rales. No wheezing.  .    ASSESSMENT  AND PLAN:  2)diabetes :A1c is 6.8    She is on metformin 500 mg only once at noon as she eats only one meal   She could not tolerate ozempic     Sister had pancreatic cancer   US abdomen to look at pancreas was normal 2/22   Will recheck A1c today   2) hypertension  Stable   3) hyperlipidemia :   On crestor 10 mg daily and zetia   Lipid panel 1/24  ldl is at goal  4) cholelithiasis: s/p cholecystectomy will try cholestyramine before meal to see If that helps diarrhea     Eye exam done October 2022  She is overdue encourage to see ophthalmology   Foot exam next visit     Constipation she is doing cholestyramine, can stop it and see      moderate

## 2025-01-08 NOTE — DIETITIAN INITIAL EVALUATION ADULT - PERTINENT MEDS FT
MEDICATIONS  (STANDING):  amLODIPine   Tablet 10 milliGRAM(s) Oral daily  calcium carbonate 1250 mG  + Vitamin D (OsCal 500 + D) 1 Tablet(s) Oral daily  cyanocobalamin 1000 MICROGram(s) Oral daily  enoxaparin Injectable 30 milliGRAM(s) SubCutaneous every 24 hours  fluticasone propionate/ salmeterol 250-50 MICROgram(s) Diskus 1 Dose(s) Inhalation two times a day  montelukast 10 milliGRAM(s) Oral daily  pantoprazole    Tablet 40 milliGRAM(s) Oral before breakfast  rosuvastatin 10 milliGRAM(s) Oral at bedtime  senna 2 Tablet(s) Oral at bedtime    MEDICATIONS  (PRN):  acetaminophen     Tablet .. 650 milliGRAM(s) Oral every 6 hours PRN Mild Pain (1 - 3), Moderate Pain (4 - 6)  albuterol/ipratropium for Nebulization 3 milliLiter(s) Nebulizer every 6 hours PRN Shortness of Breath and/or Wheezing  aluminum hydroxide/magnesium hydroxide/simethicone Suspension 30 milliLiter(s) Oral every 6 hours PRN Dyspepsia  benzonatate 100 milliGRAM(s) Oral three times a day PRN Cough  guaiFENesin Oral Liquid (Sugar-Free) 200 milliGRAM(s) Oral every 6 hours PRN Cough  melatonin 3 milliGRAM(s) Oral at bedtime PRN Insomnia

## 2025-01-08 NOTE — PROGRESS NOTE ADULT - SUBJECTIVE AND OBJECTIVE BOX
Patient: JESSENIA CANO [id number] 84y Female                            Hospitalist Attending Note    Off O2.  No wheezing.  SaO2 88% overnight at rest, requiring O2.    ____________________PHYSICAL EXAM:  GENERAL:  NAD Alert and Oriented x 3   HEENT: NCAT  CARDIOVASCULAR:  S1, S2  LUNGS: coarse BS b/l.    ABDOMEN:  soft, (-) tenderness, (-) distension, (+) bowel sounds, (-) guarding, (-) rebound (-) rigidity  EXTREMITIES:  no cyanosis / clubbing / edema.   ____________________      VITALS:  Vital Signs Last 24 Hrs  T(C): 36.6 (08 Jan 2025 11:37), Max: 36.8 (07 Jan 2025 17:22)  T(F): 97.9 (08 Jan 2025 11:37), Max: 98.3 (07 Jan 2025 17:22)  HR: 94 (08 Jan 2025 11:37) (83 - 99)  BP: 132/73 (08 Jan 2025 11:37) (128/70 - 132/73)  BP(mean): --  RR: 18 (08 Jan 2025 11:37) (17 - 18)  SpO2: 93% (08 Jan 2025 11:37) (93% - 97%)    Parameters below as of 08 Jan 2025 11:37  Patient On (Oxygen Delivery Method): nasal cannula     Daily     Daily   CAPILLARY BLOOD GLUCOSE        I&O's Summary    07 Jan 2025 07:01  -  08 Jan 2025 07:00  --------------------------------------------------------  IN: 720 mL / OUT: 0 mL / NET: 720 mL    08 Jan 2025 07:01  -  08 Jan 2025 15:03  --------------------------------------------------------  IN: 360 mL / OUT: 0 mL / NET: 360 mL        LABS:                        12.3   9.28  )-----------( 284      ( 07 Jan 2025 07:50 )             41.8     01-07    137  |  101  |  20  ----------------------------<  86  4.1   |  34[H]  |  0.33[L]    Ca    9.2      07 Jan 2025 07:50          Urinalysis Basic - ( 07 Jan 2025 07:50 )    Color: x / Appearance: x / SG: x / pH: x  Gluc: 86 mg/dL / Ketone: x  / Bili: x / Urobili: x   Blood: x / Protein: x / Nitrite: x   Leuk Esterase: x / RBC: x / WBC x   Sq Epi: x / Non Sq Epi: x / Bacteria: x              MEDICATIONS:  acetaminophen     Tablet .. 650 milliGRAM(s) Oral every 6 hours PRN  albuterol/ipratropium for Nebulization 3 milliLiter(s) Nebulizer every 6 hours PRN  aluminum hydroxide/magnesium hydroxide/simethicone Suspension 30 milliLiter(s) Oral every 6 hours PRN  amLODIPine   Tablet 10 milliGRAM(s) Oral daily  benzonatate 100 milliGRAM(s) Oral three times a day PRN  calcium carbonate 1250 mG  + Vitamin D (OsCal 500 + D) 1 Tablet(s) Oral daily  cyanocobalamin 1000 MICROGram(s) Oral daily  enoxaparin Injectable 30 milliGRAM(s) SubCutaneous every 24 hours  fluticasone propionate/ salmeterol 250-50 MICROgram(s) Diskus 1 Dose(s) Inhalation two times a day  guaiFENesin Oral Liquid (Sugar-Free) 200 milliGRAM(s) Oral every 6 hours PRN  melatonin 3 milliGRAM(s) Oral at bedtime PRN  montelukast 10 milliGRAM(s) Oral daily  pantoprazole    Tablet 40 milliGRAM(s) Oral before breakfast  rosuvastatin 10 milliGRAM(s) Oral at bedtime  senna 2 Tablet(s) Oral at bedtime

## 2025-01-09 ENCOUNTER — TRANSCRIPTION ENCOUNTER (OUTPATIENT)
Age: 85
End: 2025-01-09

## 2025-01-09 LAB
ANION GAP SERPL CALC-SCNC: 2 MMOL/L — LOW (ref 5–17)
BUN SERPL-MCNC: 25 MG/DL — HIGH (ref 7–23)
CALCIUM SERPL-MCNC: 9.4 MG/DL — SIGNIFICANT CHANGE UP (ref 8.5–10.1)
CHLORIDE SERPL-SCNC: 103 MMOL/L — SIGNIFICANT CHANGE UP (ref 96–108)
CO2 SERPL-SCNC: 33 MMOL/L — HIGH (ref 22–31)
CREAT SERPL-MCNC: 0.29 MG/DL — LOW (ref 0.5–1.3)
EGFR: 107 ML/MIN/1.73M2 — SIGNIFICANT CHANGE UP
GLUCOSE SERPL-MCNC: 85 MG/DL — SIGNIFICANT CHANGE UP (ref 70–99)
HCT VFR BLD CALC: 36.4 % — SIGNIFICANT CHANGE UP (ref 34.5–45)
HGB BLD-MCNC: 10.8 G/DL — LOW (ref 11.5–15.5)
MCHC RBC-ENTMCNC: 24.9 PG — LOW (ref 27–34)
MCHC RBC-ENTMCNC: 29.7 G/DL — LOW (ref 32–36)
MCV RBC AUTO: 84.1 FL — SIGNIFICANT CHANGE UP (ref 80–100)
NRBC # BLD: 0 /100 WBCS — SIGNIFICANT CHANGE UP (ref 0–0)
PLATELET # BLD AUTO: 303 K/UL — SIGNIFICANT CHANGE UP (ref 150–400)
POTASSIUM SERPL-MCNC: 4.4 MMOL/L — SIGNIFICANT CHANGE UP (ref 3.5–5.3)
POTASSIUM SERPL-SCNC: 4.4 MMOL/L — SIGNIFICANT CHANGE UP (ref 3.5–5.3)
RBC # BLD: 4.33 M/UL — SIGNIFICANT CHANGE UP (ref 3.8–5.2)
RBC # FLD: 16.8 % — HIGH (ref 10.3–14.5)
SODIUM SERPL-SCNC: 138 MMOL/L — SIGNIFICANT CHANGE UP (ref 135–145)
WBC # BLD: 8.19 K/UL — SIGNIFICANT CHANGE UP (ref 3.8–10.5)
WBC # FLD AUTO: 8.19 K/UL — SIGNIFICANT CHANGE UP (ref 3.8–10.5)

## 2025-01-09 PROCEDURE — 99239 HOSP IP/OBS DSCHRG MGMT >30: CPT

## 2025-01-09 RX ADMIN — FLUTICASONE PROPIONATE AND SALMETEROL 1 DOSE(S): 50; 500 POWDER ORAL; RESPIRATORY (INHALATION) at 05:34

## 2025-01-09 RX ADMIN — PANTOPRAZOLE 40 MILLIGRAM(S): 40 TABLET, DELAYED RELEASE ORAL at 08:52

## 2025-01-09 RX ADMIN — FLUTICASONE PROPIONATE AND SALMETEROL 1 DOSE(S): 50; 500 POWDER ORAL; RESPIRATORY (INHALATION) at 18:11

## 2025-01-09 RX ADMIN — Medication 10 MILLIGRAM(S): at 05:34

## 2025-01-09 RX ADMIN — ENOXAPARIN SODIUM 30 MILLIGRAM(S): 60 INJECTION INTRAVENOUS; SUBCUTANEOUS at 18:09

## 2025-01-09 RX ADMIN — MONTELUKAST SODIUM 10 MILLIGRAM(S): 10 TABLET, FILM COATED ORAL at 21:20

## 2025-01-09 RX ADMIN — CYANOCOBALAMIN 1000 MICROGRAM(S): 1000 INJECTION, SOLUTION INTRAMUSCULAR; SUBCUTANEOUS at 12:22

## 2025-01-09 RX ADMIN — ROSUVASTATIN 10 MILLIGRAM(S): 40 TABLET, FILM COATED ORAL at 21:20

## 2025-01-09 RX ADMIN — Medication 1 TABLET(S): at 14:15

## 2025-01-09 NOTE — PROGRESS NOTE ADULT - SUBJECTIVE AND OBJECTIVE BOX
Patient: JESSENIA CANO [id number] 84y Female                            Hospitalist Attending Note    Off O2.  No wheezing.  SaO2 88% overnight at rest, requiring O2.  Seen with daughter present via pt's cellphone.    ____________________PHYSICAL EXAM:  GENERAL:  NAD Alert and Oriented x 3   HEENT: NCAT  CARDIOVASCULAR:  S1, S2  LUNGS: coarse BS b/l.    ABDOMEN:  soft, (-) tenderness, (-) distension, (+) bowel sounds, (-) guarding, (-) rebound (-) rigidity  EXTREMITIES:  no cyanosis / clubbing / edema.   ____________________    VITALS:  Vital Signs Last 24 Hrs  T(C): 36.2 (09 Jan 2025 10:37), Max: 36.8 (08 Jan 2025 17:27)  T(F): 97.2 (09 Jan 2025 10:37), Max: 98.3 (08 Jan 2025 17:27)  HR: 97 (09 Jan 2025 10:37) (88 - 98)  BP: 123/64 (09 Jan 2025 10:37) (121/72 - 133/74)  BP(mean): --  RR: 18 (09 Jan 2025 10:37) (17 - 18)  SpO2: 94% (09 Jan 2025 10:37) (76% - 94%)    Parameters below as of 09 Jan 2025 09:40  Patient On (Oxygen Delivery Method): nasal cannula     Daily     Daily   CAPILLARY BLOOD GLUCOSE        I&O's Summary    08 Jan 2025 07:01  -  09 Jan 2025 07:00  --------------------------------------------------------  IN: 360 mL / OUT: 0 mL / NET: 360 mL        LABS:                        10.8   8.19  )-----------( 303      ( 09 Jan 2025 06:38 )             36.4     01-09    138  |  103  |  25[H]  ----------------------------<  85  4.4   |  33[H]  |  0.29[L]    Ca    9.4      09 Jan 2025 06:38          Urinalysis Basic - ( 09 Jan 2025 06:38 )    Color: x / Appearance: x / SG: x / pH: x  Gluc: 85 mg/dL / Ketone: x  / Bili: x / Urobili: x   Blood: x / Protein: x / Nitrite: x   Leuk Esterase: x / RBC: x / WBC x   Sq Epi: x / Non Sq Epi: x / Bacteria: x              MEDICATIONS:  acetaminophen     Tablet .. 650 milliGRAM(s) Oral every 6 hours PRN  albuterol/ipratropium for Nebulization 3 milliLiter(s) Nebulizer every 6 hours PRN  aluminum hydroxide/magnesium hydroxide/simethicone Suspension 30 milliLiter(s) Oral every 6 hours PRN  amLODIPine   Tablet 10 milliGRAM(s) Oral daily  benzonatate 100 milliGRAM(s) Oral three times a day PRN  calcium carbonate 1250 mG  + Vitamin D (OsCal 500 + D) 1 Tablet(s) Oral daily  cyanocobalamin 1000 MICROGram(s) Oral daily  enoxaparin Injectable 30 milliGRAM(s) SubCutaneous every 24 hours  fluticasone propionate/ salmeterol 250-50 MICROgram(s) Diskus 1 Dose(s) Inhalation two times a day  guaiFENesin Oral Liquid (Sugar-Free) 200 milliGRAM(s) Oral every 6 hours PRN  melatonin 3 milliGRAM(s) Oral at bedtime PRN  montelukast 10 milliGRAM(s) Oral daily  pantoprazole    Tablet 40 milliGRAM(s) Oral before breakfast  rosuvastatin 10 milliGRAM(s) Oral at bedtime  senna 2 Tablet(s) Oral at bedtime

## 2025-01-09 NOTE — DISCHARGE NOTE NURSING/CASE MANAGEMENT/SOCIAL WORK - NSDCFUADDAPPT_GEN_ALL_CORE_FT
APPTS ARE READY TO BE MADE: [X] YES    Best Family or Patient Contact (if needed):    Additional Information about above appointments (if needed):    During your hospitalization, you had abnormal findings on radiologic / laboratory studies.  Please see your primary doctor for followup of these findings to ensure that they have resolved.  You may require further evaluation to exclude certain serious conditions, and / or treatment.  < from: CT Chest No Cont (01.03.25 @ 15:06) >    Left upper lobe groundglass infiltrate, likely infectious or   inflammatory, with underlying discoid atelectasis and bronchiectasis.    Focally occluded proximal left upper lobe bronchus likely with chronic   secretions. Cannot exclude underlying mass.    < end of copied text >

## 2025-01-09 NOTE — DISCHARGE NOTE NURSING/CASE MANAGEMENT/SOCIAL WORK - FINANCIAL ASSISTANCE
[facility] provides services at a reduced cost to those who are determined to be eligible through [facility]’s financial assistance program. Information regarding [facility]’s financial assistance program can be found by going to [email or web address] or by calling [phone].

## 2025-01-09 NOTE — PROGRESS NOTE ADULT - ASSESSMENT
84 years old female with h/o HTN, HLD, asthma present to ED with complain of worsening SOB and wheezing.    < from: Xray Chest 1 View- PORTABLE-Urgent (Xray Chest 1 View- PORTABLE-Urgent .) (12.30.24 @ 12:23) >  Left hilar infiltrate/atelectasis..   < end of copied text >  < from: CT Chest No Cont (01.03.25 @ 15:06) >  Left upper lobe groundglass infiltrate, likely infectious or inflammatory, with underlying discoid atelectasis and bronchiectasis.  Focally occluded proximal left upper lobe bronchus likely with chronic secretions. Cannot exclude underlying mass.  < end of copied text >      # Acute Respiratory Failure with Hypoxia - Now off O2, but requiring nocturnal O2.  Discussed with pulmonary.   # Pneumonia - CXR (I personally reviewed) w/ L hilar consolidation.  Complete Abx.  I d/w patient to have repeat CXR in 4-6 weeks to ensure resolution. RVP Strep, Mycoplasma, Legionella, BCx all negative.    # Atelectasis / Bronchiectasis - trial of incentive spirometry, Mucomyst.  Pulmonary f/u appreciated.    # Asthma with Acute Exacerbation - Continues to have wheeze.  Continue steroids, nebulizers prn.  Singulair.  Advair.  Pulmonary following  # Essential HTN - Monitor BP.  Continue antihypertensives.  # Hyperlipidemia - diet modification.  Continue Statin.  # Moderate Protein Calorie Malnutrition - encourage po intake.  Supplements as ordered.   # Inpatient DVT Prophylaxis - Lovenox subcut    Plan of care d/w daughter Asia 1/9  Stable for d/c home with homecare, Nocturnal home O2.  I d/w pulmonary regarding sleep study as outpatient to further assess nocturnal hypoxia.

## 2025-01-09 NOTE — DISCHARGE NOTE NURSING/CASE MANAGEMENT/SOCIAL WORK - NSDCPEFALRISK_GEN_ALL_CORE
For information on Fall & Injury Prevention, visit: [email or web address] OR  [email or web address] OR  https://www.cdc.gov/steadi/patient.html

## 2025-01-09 NOTE — DISCHARGE NOTE NURSING/CASE MANAGEMENT/SOCIAL WORK - PATIENT PORTAL LINK FT
You can access the FollowMyHealth Patient Portal offered by [facility] by registering at the following website: [email or web address]. By joining [employer]’s FollowMyHealth portal, you will also be able to view your health information using other applications (apps) compatible with our system.

## 2025-01-10 VITALS
RESPIRATION RATE: 18 BRPM | OXYGEN SATURATION: 92 % | TEMPERATURE: 98 F | DIASTOLIC BLOOD PRESSURE: 75 MMHG | SYSTOLIC BLOOD PRESSURE: 119 MMHG | HEART RATE: 95 BPM

## 2025-01-10 PROCEDURE — 99232 SBSQ HOSP IP/OBS MODERATE 35: CPT

## 2025-01-10 RX ADMIN — PANTOPRAZOLE 40 MILLIGRAM(S): 40 TABLET, DELAYED RELEASE ORAL at 08:25

## 2025-01-10 RX ADMIN — Medication 1 TABLET(S): at 11:38

## 2025-01-10 RX ADMIN — CYANOCOBALAMIN 1000 MICROGRAM(S): 1000 INJECTION, SOLUTION INTRAMUSCULAR; SUBCUTANEOUS at 11:38

## 2025-01-10 RX ADMIN — FLUTICASONE PROPIONATE AND SALMETEROL 1 DOSE(S): 50; 500 POWDER ORAL; RESPIRATORY (INHALATION) at 05:15

## 2025-01-10 RX ADMIN — Medication 10 MILLIGRAM(S): at 05:13

## 2025-01-10 NOTE — PROGRESS NOTE ADULT - PROVIDER SPECIALTY LIST ADULT
Hospitalist
Pulmonology
Hospitalist

## 2025-01-10 NOTE — PROGRESS NOTE ADULT - SUBJECTIVE AND OBJECTIVE BOX
Patient: JESSENIA CANO [id number] 84y Female                            Hospitalist Attending Note    Off O2.  No wheezing.      ____________________PHYSICAL EXAM:  GENERAL:  NAD Alert and Oriented x 3   HEENT: NCAT  CARDIOVASCULAR:  S1, S2  LUNGS: coarse BS b/l.    ABDOMEN:  soft, (-) tenderness, (-) distension, (+) bowel sounds, (-) guarding, (-) rebound (-) rigidity  EXTREMITIES:  no cyanosis / clubbing / edema.   ____________________    VITALS:  Vital Signs Last 24 Hrs  T(C): 36.6 (10 Ezra 2025 11:20), Max: 36.6 (09 Jan 2025 23:39)  T(F): 97.8 (10 Ezra 2025 11:20), Max: 97.8 (09 Jan 2025 23:39)  HR: 95 (10 Ezra 2025 11:20) (88 - 95)  BP: 119/75 (10 Ezra 2025 11:20) (119/75 - 134/74)  BP(mean): --  RR: 18 (10 Ezra 2025 11:20) (18 - 18)  SpO2: 92% (10 Ezra 2025 11:20) (91% - 94%)    Parameters below as of 10 Ezra 2025 09:58  Patient On (Oxygen Delivery Method): nasal cannula     Daily     Daily   CAPILLARY BLOOD GLUCOSE        I&O's Summary      LABS:                        10.8   8.19  )-----------( 303      ( 09 Jan 2025 06:38 )             36.4     01-09    138  |  103  |  25[H]  ----------------------------<  85  4.4   |  33[H]  |  0.29[L]    Ca    9.4      09 Jan 2025 06:38          Urinalysis Basic - ( 09 Jan 2025 06:38 )    Color: x / Appearance: x / SG: x / pH: x  Gluc: 85 mg/dL / Ketone: x  / Bili: x / Urobili: x   Blood: x / Protein: x / Nitrite: x   Leuk Esterase: x / RBC: x / WBC x   Sq Epi: x / Non Sq Epi: x / Bacteria: x              MEDICATIONS:  acetaminophen     Tablet .. 650 milliGRAM(s) Oral every 6 hours PRN  albuterol/ipratropium for Nebulization 3 milliLiter(s) Nebulizer every 6 hours PRN  aluminum hydroxide/magnesium hydroxide/simethicone Suspension 30 milliLiter(s) Oral every 6 hours PRN  amLODIPine   Tablet 10 milliGRAM(s) Oral daily  benzonatate 100 milliGRAM(s) Oral three times a day PRN  calcium carbonate 1250 mG  + Vitamin D (OsCal 500 + D) 1 Tablet(s) Oral daily  cyanocobalamin 1000 MICROGram(s) Oral daily  enoxaparin Injectable 30 milliGRAM(s) SubCutaneous every 24 hours  fluticasone propionate/ salmeterol 250-50 MICROgram(s) Diskus 1 Dose(s) Inhalation two times a day  guaiFENesin Oral Liquid (Sugar-Free) 200 milliGRAM(s) Oral every 6 hours PRN  melatonin 3 milliGRAM(s) Oral at bedtime PRN  montelukast 10 milliGRAM(s) Oral daily  pantoprazole    Tablet 40 milliGRAM(s) Oral before breakfast  rosuvastatin 10 milliGRAM(s) Oral at bedtime  senna 2 Tablet(s) Oral at bedtime

## 2025-01-10 NOTE — PROGRESS NOTE ADULT - REASON FOR ADMISSION
hypoxic respiratory failure, asthma exacerbation

## 2025-01-10 NOTE — PROGRESS NOTE ADULT - NUTRITIONAL ASSESSMENT
This patient has been assessed with a concern for Malnutrition and has been determined to have a diagnosis/diagnoses of Moderate protein-calorie malnutrition.    This patient is being managed with:   Diet DASH/TLC-  Sodium & Cholesterol Restricted  Entered: Dec 30 2024  1:34PM    The following pending diet order is being considered for treatment of Moderate protein-calorie malnutrition:  Diet Easy to Chew-  Low Sodium  Supplement Feeding Modality:  Oral  Ensure Plus High Protein Cans or Servings Per Day:  1       Frequency:  Two Times a day  Entered: Jan 8 2025 11:03AM  
This patient has been assessed with a concern for Malnutrition and has been determined to have a diagnosis/diagnoses of Moderate protein-calorie malnutrition.    This patient is being managed with:   Diet Easy to Chew-  Low Sodium  Supplement Feeding Modality:  Oral  Ensure Plus High Protein Cans or Servings Per Day:  1       Frequency:  Two Times a day  Entered: Jan 8 2025 11:03AM  
This patient has been assessed with a concern for Malnutrition and has been determined to have a diagnosis/diagnoses of Moderate protein-calorie malnutrition.    This patient is being managed with:   Diet Easy to Chew-  Low Sodium  Supplement Feeding Modality:  Oral  Ensure Plus High Protein Cans or Servings Per Day:  1       Frequency:  Two Times a day  Entered: Jan 8 2025 11:03AM

## 2025-01-14 DIAGNOSIS — E78.5 HYPERLIPIDEMIA, UNSPECIFIED: ICD-10-CM

## 2025-01-14 DIAGNOSIS — E44.0 MODERATE PROTEIN-CALORIE MALNUTRITION: ICD-10-CM

## 2025-01-14 DIAGNOSIS — J96.01 ACUTE RESPIRATORY FAILURE WITH HYPOXIA: ICD-10-CM

## 2025-01-14 DIAGNOSIS — I10 ESSENTIAL (PRIMARY) HYPERTENSION: ICD-10-CM

## 2025-01-14 DIAGNOSIS — J98.11 ATELECTASIS: ICD-10-CM

## 2025-01-14 DIAGNOSIS — Z79.899 OTHER LONG TERM (CURRENT) DRUG THERAPY: ICD-10-CM

## 2025-01-14 DIAGNOSIS — J45.901 UNSPECIFIED ASTHMA WITH (ACUTE) EXACERBATION: ICD-10-CM

## 2025-01-14 DIAGNOSIS — J18.9 PNEUMONIA, UNSPECIFIED ORGANISM: ICD-10-CM

## 2025-01-14 NOTE — CHART NOTE - NSCHARTNOTEFT_GEN_A_CORE
1/14-Patient was outreached but did not answer. A voicemail was left for the patient to return our call.